# Patient Record
Sex: FEMALE | Race: WHITE | NOT HISPANIC OR LATINO | Employment: OTHER | URBAN - METROPOLITAN AREA
[De-identification: names, ages, dates, MRNs, and addresses within clinical notes are randomized per-mention and may not be internally consistent; named-entity substitution may affect disease eponyms.]

---

## 2017-04-26 ENCOUNTER — TRANSCRIBE ORDERS (OUTPATIENT)
Dept: ADMINISTRATIVE | Facility: HOSPITAL | Age: 72
End: 2017-04-26

## 2017-04-26 ENCOUNTER — LAB (OUTPATIENT)
Dept: LAB | Facility: HOSPITAL | Age: 72
End: 2017-04-26

## 2017-04-26 DIAGNOSIS — Z01.818 PREOP TESTING: Primary | ICD-10-CM

## 2017-04-26 DIAGNOSIS — Z01.818 PREOP TESTING: ICD-10-CM

## 2017-04-26 LAB
ALBUMIN SERPL-MCNC: 3.9 G/DL (ref 3.5–5.2)
ALBUMIN/GLOB SERPL: 1.3 G/DL
ALP SERPL-CCNC: 73 U/L (ref 40–129)
ALT SERPL W P-5'-P-CCNC: 24 U/L (ref 5–33)
ANION GAP SERPL CALCULATED.3IONS-SCNC: 12.7 MMOL/L
AST SERPL-CCNC: 20 U/L (ref 5–32)
BASOPHILS # BLD AUTO: 0.02 10*3/MM3 (ref 0–0.2)
BASOPHILS NFR BLD AUTO: 0.3 % (ref 0–2)
BILIRUB SERPL-MCNC: 0.5 MG/DL (ref 0.2–1.2)
BUN BLD-MCNC: 17 MG/DL (ref 8–23)
BUN/CREAT SERPL: 21 (ref 7–25)
CALCIUM SPEC-SCNC: 9.7 MG/DL (ref 8.8–10.5)
CHLORIDE SERPL-SCNC: 98 MMOL/L (ref 98–107)
CO2 SERPL-SCNC: 27.3 MMOL/L (ref 22–29)
CREAT BLD-MCNC: 0.81 MG/DL (ref 0.57–1)
DEPRECATED RDW RBC AUTO: 38.5 FL (ref 37–54)
EOSINOPHIL # BLD AUTO: 0.07 10*3/MM3 (ref 0.1–0.3)
EOSINOPHIL NFR BLD AUTO: 1.1 % (ref 0–4)
ERYTHROCYTE [DISTWIDTH] IN BLOOD BY AUTOMATED COUNT: 11.7 % (ref 11.5–14.5)
GFR SERPL CREATININE-BSD FRML MDRD: 69 ML/MIN/1.73
GLOBULIN UR ELPH-MCNC: 2.9 GM/DL
GLUCOSE BLD-MCNC: 177 MG/DL (ref 65–99)
HCT VFR BLD AUTO: 40.8 % (ref 37–47)
HGB BLD-MCNC: 13.5 G/DL (ref 12–16)
IMM GRANULOCYTES # BLD: 0.01 10*3/MM3 (ref 0–0.03)
IMM GRANULOCYTES NFR BLD: 0.2 % (ref 0–0.5)
LYMPHOCYTES # BLD AUTO: 2.31 10*3/MM3 (ref 0.6–4.8)
LYMPHOCYTES NFR BLD AUTO: 35.4 % (ref 20–45)
MCH RBC QN AUTO: 29.9 PG (ref 27–31)
MCHC RBC AUTO-ENTMCNC: 33.1 G/DL (ref 31–37)
MCV RBC AUTO: 90.3 FL (ref 81–99)
MONOCYTES # BLD AUTO: 0.33 10*3/MM3 (ref 0–1)
MONOCYTES NFR BLD AUTO: 5.1 % (ref 3–8)
NEUTROPHILS # BLD AUTO: 3.79 10*3/MM3 (ref 1.5–8.3)
NEUTROPHILS NFR BLD AUTO: 57.9 % (ref 45–70)
NRBC BLD MANUAL-RTO: 0 /100 WBC (ref 0–0)
PLATELET # BLD AUTO: 232 10*3/MM3 (ref 140–500)
PMV BLD AUTO: 9.8 FL (ref 7.4–10.4)
POTASSIUM BLD-SCNC: 4 MMOL/L (ref 3.5–5.2)
PROT SERPL-MCNC: 6.8 G/DL (ref 6–8.5)
RBC # BLD AUTO: 4.52 10*6/MM3 (ref 4.2–5.4)
SODIUM BLD-SCNC: 138 MMOL/L (ref 136–145)
WBC NRBC COR # BLD: 6.53 10*3/MM3 (ref 4.8–10.8)

## 2017-04-26 PROCEDURE — 85025 COMPLETE CBC W/AUTO DIFF WBC: CPT

## 2017-04-26 PROCEDURE — 36415 COLL VENOUS BLD VENIPUNCTURE: CPT

## 2017-04-26 PROCEDURE — 80053 COMPREHEN METABOLIC PANEL: CPT

## 2017-05-02 DIAGNOSIS — I10 ESSENTIAL HYPERTENSION: ICD-10-CM

## 2017-05-02 RX ORDER — LISINOPRIL 5 MG/1
TABLET ORAL
Qty: 90 TABLET | Refills: 0 | Status: SHIPPED | OUTPATIENT
Start: 2017-05-02 | End: 2017-07-31 | Stop reason: SDUPTHER

## 2017-07-31 DIAGNOSIS — I10 ESSENTIAL HYPERTENSION: ICD-10-CM

## 2017-07-31 RX ORDER — LISINOPRIL 5 MG/1
TABLET ORAL
Qty: 30 TABLET | Refills: 0 | Status: SHIPPED | OUTPATIENT
Start: 2017-07-31 | End: 2017-08-28 | Stop reason: SDUPTHER

## 2017-08-28 DIAGNOSIS — I10 ESSENTIAL HYPERTENSION: ICD-10-CM

## 2017-08-28 RX ORDER — LISINOPRIL 5 MG/1
5 TABLET ORAL DAILY
Qty: 90 TABLET | Refills: 0 | Status: SHIPPED | OUTPATIENT
Start: 2017-08-28 | End: 2017-09-07 | Stop reason: SDUPTHER

## 2017-09-07 ENCOUNTER — OFFICE VISIT (OUTPATIENT)
Dept: FAMILY MEDICINE CLINIC | Facility: CLINIC | Age: 72
End: 2017-09-07

## 2017-09-07 VITALS
TEMPERATURE: 97.6 F | WEIGHT: 210 LBS | BODY MASS INDEX: 31.83 KG/M2 | SYSTOLIC BLOOD PRESSURE: 130 MMHG | HEART RATE: 70 BPM | OXYGEN SATURATION: 98 % | DIASTOLIC BLOOD PRESSURE: 76 MMHG | HEIGHT: 68 IN | RESPIRATION RATE: 16 BRPM

## 2017-09-07 DIAGNOSIS — I10 ESSENTIAL HYPERTENSION: Primary | ICD-10-CM

## 2017-09-07 DIAGNOSIS — E78.00 HYPERCHOLESTEROLEMIA: ICD-10-CM

## 2017-09-07 LAB
ALBUMIN SERPL-MCNC: 4.2 G/DL (ref 3.5–5.2)
ALBUMIN/GLOB SERPL: 1.4 G/DL
ALP SERPL-CCNC: 73 U/L (ref 40–129)
ALT SERPL-CCNC: 23 U/L (ref 5–33)
AST SERPL-CCNC: 21 U/L (ref 5–32)
BILIRUB SERPL-MCNC: 0.4 MG/DL (ref 0.2–1.2)
BUN SERPL-MCNC: 19 MG/DL (ref 8–23)
BUN/CREAT SERPL: 25 (ref 7–25)
CALCIUM SERPL-MCNC: 9.7 MG/DL (ref 8.8–10.5)
CHLORIDE SERPL-SCNC: 98 MMOL/L (ref 98–107)
CHOLEST SERPL-MCNC: 231 MG/DL (ref 0–200)
CO2 SERPL-SCNC: 29.9 MMOL/L (ref 22–29)
CREAT SERPL-MCNC: 0.76 MG/DL (ref 0.57–1)
GLOBULIN SER CALC-MCNC: 2.9 GM/DL
GLUCOSE SERPL-MCNC: 102 MG/DL (ref 65–99)
HDLC SERPL-MCNC: 63 MG/DL (ref 40–60)
LDLC SERPL CALC-MCNC: 135 MG/DL (ref 0–100)
LDLC/HDLC SERPL: 2.14 {RATIO}
POTASSIUM SERPL-SCNC: 4.3 MMOL/L (ref 3.5–5.2)
PROT SERPL-MCNC: 7.1 G/DL (ref 6–8.5)
SODIUM SERPL-SCNC: 140 MMOL/L (ref 136–145)
TRIGL SERPL-MCNC: 167 MG/DL (ref 0–150)
VLDLC SERPL CALC-MCNC: 33.4 MG/DL (ref 7–27)

## 2017-09-07 PROCEDURE — 99213 OFFICE O/P EST LOW 20 MIN: CPT | Performed by: PHYSICIAN ASSISTANT

## 2017-09-07 RX ORDER — LISINOPRIL 5 MG/1
5 TABLET ORAL DAILY
Qty: 90 TABLET | Refills: 3 | Status: SHIPPED | OUTPATIENT
Start: 2017-09-07 | End: 2018-08-03 | Stop reason: SDUPTHER

## 2017-09-07 NOTE — PROGRESS NOTES
"Subjective   Sanjana Morse is a 72 y.o. female.   Chief Complaint   Patient presents with   • Hypertension     management       History of Present Illness     Sanjana is a 72-year-old female who presents for hypertension management.  Lost 5 pounds since November 28, 2016. Her blood pressure readings at home have been running 118-132/70's.  Denied any chest pain,shortness of air, headaches,wheezing or swelling of ankles.  Appetite has been healthy.  Sleep has been normal.  She has been walking daily.  Sanjana has complaint on her medications.      The following portions of the patient's history were reviewed and updated as appropriate: allergies, current medications, past family history, past medical history, past social history and past surgical history.    Review of Systems   Constitutional: Negative.    HENT: Negative.    Eyes: Negative.    Respiratory: Negative.  Negative for cough, shortness of breath and wheezing.    Cardiovascular: Negative.  Negative for chest pain, palpitations and leg swelling.   Gastrointestinal: Negative.    Endocrine: Negative.    Genitourinary: Negative.    Musculoskeletal: Negative.    Skin: Negative.    Allergic/Immunologic: Negative.    Neurological: Negative.    Hematological: Negative.    Psychiatric/Behavioral: Negative.    All other systems reviewed and are negative.    Vitals:    09/07/17 0758 09/07/17 0828   BP: 150/72 130/76   BP Location: Right arm Left arm   Patient Position: Sitting Sitting   Cuff Size: Large Adult Adult   Pulse: 70    Resp: 16    Temp: 97.6 °F (36.4 °C)    TempSrc: Oral    SpO2: 98%    Weight: 210 lb (95.3 kg)    Height: 68\" (172.7 cm)        Wt Readings from Last 3 Encounters:   09/07/17 210 lb (95.3 kg)   11/28/16 215 lb 14.4 oz (97.9 kg)   11/18/15 202 lb 0.1 oz (91.6 kg)       BP Readings from Last 3 Encounters:   09/07/17 130/76   11/28/16 138/70   11/18/15 124/70     Body mass index is 31.93 kg/(m^2).  No Known Allergies    Objective   Physical " Exam   Constitutional: She is oriented to person, place, and time. Vital signs are normal. She appears well-developed and well-nourished.   Cardiovascular: Normal rate, regular rhythm, S1 normal, S2 normal, normal heart sounds and normal pulses.    Pulmonary/Chest: Effort normal and breath sounds normal.   Abdominal: Soft. Normal appearance and bowel sounds are normal. There is no hepatomegaly. There is no tenderness.   Neurological: She is alert and oriented to person, place, and time.   Skin: Skin is warm, dry and intact.   Psychiatric: She has a normal mood and affect. Her speech is normal and behavior is normal. Judgment and thought content normal. Cognition and memory are normal.       Assessment/Plan   Sanjana was seen today for hypertension.    Diagnoses and all orders for this visit:    Essential hypertension    Hypercholesterolemia    Mrs. Sanjana Barrios was seen in office today for follow-up for hypertension and hypercholesterolemia.  I have rechecked blood pressure today's office visit and got 1:30/76 in left arm.  I have refilled her lisinopril medication to pharmacy.  She is fasting will have a CMP and a lipid profile collected.  She will be notified of results when completed.  Sanjana will schedule a sequential Medicare physical after November 29, 2017.        Dragon transcription disclaimer    Much of this encounter note is an electronic transcription/translation of spoken language to printed text.  The electronic translation of spoken language may permit erroneous, or at times, nonsensical words or phrases to be inadvertently transcribed.  Although I have reviewed the note for such errors, some may still exist.    Reyna Singh PA-C  Family Practice

## 2017-09-08 ENCOUNTER — TELEPHONE (OUTPATIENT)
Dept: FAMILY MEDICINE CLINIC | Facility: CLINIC | Age: 72
End: 2017-09-08

## 2017-09-08 NOTE — TELEPHONE ENCOUNTER
----- Message from Reyna Singh PA-C sent at 9/8/2017  7:14 AM EDT -----  1.  FBS was 102 which is slightly increased.  Please decrease carbohydrates in diet.  2.  Cholesterol 231, triglycerides 167, HDL 63 and LDL was 135.  These are increased from 9 months ago.  I suspect this is related to diet.  Please decrease carbohydrates and fatty foods in diet.  Increase physical activity.  Plan to repeat lipid profile with CMP in 6 months.  If cholesterol and triglyceride readings are still elevated we will start a statin therapy.

## 2017-10-12 ENCOUNTER — OFFICE VISIT (OUTPATIENT)
Dept: FAMILY MEDICINE CLINIC | Facility: CLINIC | Age: 72
End: 2017-10-12

## 2017-10-12 VITALS
OXYGEN SATURATION: 99 % | DIASTOLIC BLOOD PRESSURE: 62 MMHG | RESPIRATION RATE: 16 BRPM | SYSTOLIC BLOOD PRESSURE: 120 MMHG | BODY MASS INDEX: 31.98 KG/M2 | TEMPERATURE: 98.1 F | HEART RATE: 86 BPM | HEIGHT: 68 IN | WEIGHT: 211 LBS

## 2017-10-12 DIAGNOSIS — R51.9 SINUS HEADACHE: Primary | ICD-10-CM

## 2017-10-12 DIAGNOSIS — J01.00 ACUTE NON-RECURRENT MAXILLARY SINUSITIS: ICD-10-CM

## 2017-10-12 PROCEDURE — 99214 OFFICE O/P EST MOD 30 MIN: CPT | Performed by: PHYSICIAN ASSISTANT

## 2017-10-12 RX ORDER — AMOXICILLIN 875 MG/1
875 TABLET, COATED ORAL 2 TIMES DAILY
Qty: 20 TABLET | Refills: 0 | Status: SHIPPED | OUTPATIENT
Start: 2017-10-12 | End: 2017-10-22

## 2017-10-12 NOTE — PROGRESS NOTES
Subjective   Sanjana Morse is a 72 y.o. female.   Chief Complaint   Patient presents with   • Pain     on top of head       History of Present Illness     Sanjana is a 72-year-old female who presents sharp headache off and on for the past 2 weeks. The pain has radiated to her right maxillary sinus area.   States the pain may last a few hours.  She has had some sinus pressure,and post nasal drip off and on.  She denied any photophobia,phonophobia,vision changes,slurred speech,facial dropping,nausea,vomiting,cough,chest pain,shortness of air,fevers,or chills.  Sanjana took her blood pressure medication an hour before office visit.  States Advil has helped with her headaches.  She has not had any pain for the past day. Appetite has been good.  Sleep has been good.  Caffeine intake has been 36-48 oz of tea a day. Denied any head injury or rashes on scalp.    The following portions of the patient's history were reviewed and updated as appropriate: allergies, current medications, past family history, past medical history, past social history and past surgical history.    Review of Systems   Constitutional: Negative.  Negative for chills, fatigue and fever.   HENT: Positive for sinus pain and sinus pressure. Negative for congestion, ear pain, postnasal drip, rhinorrhea and sore throat.    Eyes: Negative.    Respiratory: Negative.    Cardiovascular: Negative.    Gastrointestinal: Negative.    Endocrine: Negative.    Genitourinary: Negative.    Musculoskeletal: Negative.    Skin: Negative.    Allergic/Immunologic: Negative.    Neurological: Positive for light-headedness and headaches. Negative for dizziness.   Hematological: Negative.    Psychiatric/Behavioral: Negative.    All other systems reviewed and are negative.    Vitals:    10/12/17 0753 10/12/17 0809   BP: 158/62 120/62   BP Location: Left arm Left arm   Patient Position: Sitting Sitting   Cuff Size: Large Adult Adult   Pulse: 86    Resp: 16    Temp: 98.1 °F (36.7  "°C)    TempSrc: Oral    SpO2: 99%    Weight: 211 lb (95.7 kg)    Height: 68\" (172.7 cm)        Wt Readings from Last 3 Encounters:   10/12/17 211 lb (95.7 kg)   09/07/17 210 lb (95.3 kg)   11/28/16 215 lb 14.4 oz (97.9 kg)       BP Readings from Last 3 Encounters:   10/12/17 120/62   09/07/17 130/76   11/28/16 138/70     Body mass index is 32.08 kg/(m^2).  No Known Allergies    Objective   Physical Exam   Constitutional: She is oriented to person, place, and time. Vital signs are normal. She appears well-developed and well-nourished.   No slurred speech or facial drooping noted   HENT:   Head: Normocephalic and atraumatic.   Right Ear: Hearing, external ear and ear canal normal. Tympanic membrane is bulging.   Left Ear: Hearing, external ear and ear canal normal. Tympanic membrane is bulging.   Nose: Right sinus exhibits maxillary sinus tenderness. Right sinus exhibits no frontal sinus tenderness. Left sinus exhibits no maxillary sinus tenderness and no frontal sinus tenderness.   Mouth/Throat: Uvula is midline and mucous membranes are normal.   Slight post nasal drip   Eyes: Conjunctivae, EOM and lids are normal. Pupils are equal, round, and reactive to light.   Fundoscopic exam:       The right eye shows no papilledema.        The left eye shows no papilledema.   Neck: Trachea normal and phonation normal. Neck supple. Carotid bruit is not present. No edema present. No thyromegaly present.   Cardiovascular: Normal rate, regular rhythm, S1 normal, S2 normal, normal heart sounds, intact distal pulses and normal pulses.    Pulmonary/Chest: Effort normal and breath sounds normal.   Abdominal: Soft. Normal appearance, normal aorta and bowel sounds are normal. There is no hepatomegaly. There is no tenderness.   Lymphadenopathy:     She has no cervical adenopathy.   Neurological: She is alert and oriented to person, place, and time. She has normal strength and normal reflexes. No cranial nerve deficit or sensory deficit. " She displays a negative Romberg sign.   Reflex Scores:       Patellar reflexes are 2+ on the right side and 2+ on the left side.  Skin: Skin is warm, dry and intact.   Psychiatric: She has a normal mood and affect. Her speech is normal and behavior is normal. Judgment and thought content normal. Cognition and memory are normal.       Assessment/Plan   Sanjana was seen today for pain.    Diagnoses and all orders for this visit:    Sinus headache  -     amoxicillin (AMOXIL) 875 MG tablet; Take 1 tablet by mouth 2 (Two) Times a Day for 10 days.    Acute non-recurrent maxillary sinusitis  -     amoxicillin (AMOXIL) 875 MG tablet; Take 1 tablet by mouth 2 (Two) Times a Day for 10 days.      Mrs. Sanjana Morse was seen in office today for new onset acute maxillary sinusitis with sinus headache.  I suspect her symptoms are related to her sinusitis.  I have prescribed amoxicillin antibiotic to pharmacy.  Sanjana will continue using over-the-counter ibuprofen as needed for headache.  She will return to office if no improvement.  I will consider possible imaging studies of brain/sinuses if no improvement.        Dragon transcription disclaimer    Much of this encounter note is an electronic transcription/translation of spoken language to printed text.  The electronic translation of spoken language may permit erroneous, or at times, nonsensical words or phrases to be inadvertently transcribed.  Although I have reviewed the note for such errors, some may still exist.    Reyna Singh PA-C  Family Practice

## 2017-10-18 ENCOUNTER — TELEPHONE (OUTPATIENT)
Dept: FAMILY MEDICINE CLINIC | Facility: CLINIC | Age: 72
End: 2017-10-18

## 2017-10-18 DIAGNOSIS — R51.9 GENERALIZED HEADACHES: Primary | ICD-10-CM

## 2017-10-19 ENCOUNTER — HOSPITAL ENCOUNTER (OUTPATIENT)
Dept: MRI IMAGING | Facility: HOSPITAL | Age: 72
Discharge: HOME | End: 2017-10-19
Admitting: PHYSICIAN ASSISTANT

## 2017-10-19 DIAGNOSIS — R51.9 GENERALIZED HEADACHES: ICD-10-CM

## 2017-10-19 PROCEDURE — 70551 MRI BRAIN STEM W/O DYE: CPT

## 2017-10-19 RX ORDER — LORAZEPAM 0.5 MG/1
TABLET ORAL
Qty: 2 TABLET | Refills: 0
Start: 2017-10-19 | End: 2018-08-03

## 2017-10-20 ENCOUNTER — TELEPHONE (OUTPATIENT)
Dept: FAMILY MEDICINE CLINIC | Facility: CLINIC | Age: 72
End: 2017-10-20

## 2017-10-20 NOTE — TELEPHONE ENCOUNTER
----- Message from Reyna Singh PA-C sent at 10/20/2017 11:36 AM EDT -----  Notify patient that her MRI of brain was normal.  Have her stop by office today or Monday for a blood pressure check without office visit

## 2018-08-03 ENCOUNTER — OFFICE VISIT (OUTPATIENT)
Dept: FAMILY MEDICINE CLINIC | Facility: CLINIC | Age: 73
End: 2018-08-03

## 2018-08-03 VITALS
TEMPERATURE: 98.3 F | WEIGHT: 206.7 LBS | OXYGEN SATURATION: 97 % | HEIGHT: 68 IN | BODY MASS INDEX: 31.33 KG/M2 | RESPIRATION RATE: 16 BRPM | DIASTOLIC BLOOD PRESSURE: 70 MMHG | SYSTOLIC BLOOD PRESSURE: 130 MMHG | HEART RATE: 65 BPM

## 2018-08-03 DIAGNOSIS — Z00.00 MEDICARE ANNUAL WELLNESS VISIT, SUBSEQUENT: Primary | ICD-10-CM

## 2018-08-03 DIAGNOSIS — Z78.0 POSTMENOPAUSE: ICD-10-CM

## 2018-08-03 DIAGNOSIS — I10 ESSENTIAL HYPERTENSION: ICD-10-CM

## 2018-08-03 DIAGNOSIS — E78.00 HYPERCHOLESTEROLEMIA: ICD-10-CM

## 2018-08-03 DIAGNOSIS — Z12.31 SCREENING MAMMOGRAM, ENCOUNTER FOR: ICD-10-CM

## 2018-08-03 LAB
ALBUMIN SERPL-MCNC: 4.5 G/DL (ref 3.5–5.2)
ALBUMIN/GLOB SERPL: 2 G/DL
ALP SERPL-CCNC: 69 U/L (ref 40–129)
ALT SERPL-CCNC: 21 U/L (ref 5–33)
AST SERPL-CCNC: 20 U/L (ref 5–32)
BASOPHILS # BLD AUTO: 0.04 10*3/MM3 (ref 0–0.2)
BASOPHILS NFR BLD AUTO: 0.6 % (ref 0–2)
BILIRUB SERPL-MCNC: 0.6 MG/DL (ref 0.2–1.2)
BUN SERPL-MCNC: 20 MG/DL (ref 8–23)
BUN/CREAT SERPL: 24.7 (ref 7–25)
CALCIUM SERPL-MCNC: 10.1 MG/DL (ref 8.8–10.5)
CHLORIDE SERPL-SCNC: 99 MMOL/L (ref 98–107)
CHOLEST SERPL-MCNC: 207 MG/DL (ref 0–200)
CO2 SERPL-SCNC: 30.6 MMOL/L (ref 22–29)
CREAT SERPL-MCNC: 0.81 MG/DL (ref 0.57–1)
EOSINOPHIL # BLD AUTO: 0.11 10*3/MM3 (ref 0.1–0.3)
EOSINOPHIL NFR BLD AUTO: 1.5 % (ref 0–4)
ERYTHROCYTE [DISTWIDTH] IN BLOOD BY AUTOMATED COUNT: 11.5 % (ref 11.5–14.5)
GLOBULIN SER CALC-MCNC: 2.3 GM/DL
GLUCOSE SERPL-MCNC: 105 MG/DL (ref 65–99)
HCT VFR BLD AUTO: 44 % (ref 37–47)
HDLC SERPL-MCNC: 63 MG/DL (ref 40–60)
HGB BLD-MCNC: 14.4 G/DL (ref 12–16)
IMM GRANULOCYTES # BLD: 0.02 10*3/MM3 (ref 0–0.03)
IMM GRANULOCYTES NFR BLD: 0.3 % (ref 0–0.5)
LDLC SERPL CALC-MCNC: 116 MG/DL (ref 0–100)
LDLC/HDLC SERPL: 1.85 {RATIO}
LYMPHOCYTES # BLD AUTO: 2.67 10*3/MM3 (ref 0.6–4.8)
LYMPHOCYTES NFR BLD AUTO: 37.6 % (ref 20–45)
MCH RBC QN AUTO: 31.2 PG (ref 27–31)
MCHC RBC AUTO-ENTMCNC: 32.7 G/DL (ref 31–37)
MCV RBC AUTO: 95.4 FL (ref 81–99)
MONOCYTES # BLD AUTO: 0.63 10*3/MM3 (ref 0–1)
MONOCYTES NFR BLD AUTO: 8.9 % (ref 3–8)
NEUTROPHILS # BLD AUTO: 3.64 10*3/MM3 (ref 1.5–8.3)
NEUTROPHILS NFR BLD AUTO: 51.1 % (ref 45–70)
NRBC BLD AUTO-RTO: 0 /100 WBC (ref 0–0)
PLATELET # BLD AUTO: 250 10*3/MM3 (ref 140–500)
POTASSIUM SERPL-SCNC: 4.6 MMOL/L (ref 3.5–5.2)
PROT SERPL-MCNC: 6.8 G/DL (ref 6–8.5)
RBC # BLD AUTO: 4.61 10*6/MM3 (ref 4.2–5.4)
SODIUM SERPL-SCNC: 139 MMOL/L (ref 136–145)
TRIGL SERPL-MCNC: 138 MG/DL (ref 0–150)
VLDLC SERPL CALC-MCNC: 27.6 MG/DL (ref 7–27)
WBC # BLD AUTO: 7.11 10*3/MM3 (ref 4.8–10.8)

## 2018-08-03 PROCEDURE — 99212 OFFICE O/P EST SF 10 MIN: CPT | Performed by: PHYSICIAN ASSISTANT

## 2018-08-03 PROCEDURE — 96160 PT-FOCUSED HLTH RISK ASSMT: CPT | Performed by: PHYSICIAN ASSISTANT

## 2018-08-03 PROCEDURE — G0439 PPPS, SUBSEQ VISIT: HCPCS | Performed by: PHYSICIAN ASSISTANT

## 2018-08-03 RX ORDER — LISINOPRIL 5 MG/1
5 TABLET ORAL DAILY
Qty: 90 TABLET | Refills: 3 | Status: SHIPPED | OUTPATIENT
Start: 2018-08-03 | End: 2019-08-22

## 2018-08-03 NOTE — PROGRESS NOTES
QUICK REFERENCE INFORMATION:  The ABCs of the Annual Wellness Visit    Subsequent Medicare Wellness Visit    HEALTH RISK ASSESSMENT    1945    Recent Hospitalizations:  No hospitalization(s) within the last year..        Current Medical Providers:  Patient Care Team:  Reyna Singh PA-C as PCP - General        Smoking Status:  History   Smoking Status   • Former Smoker   Smokeless Tobacco   • Not on file       Alcohol Consumption:  History   Alcohol use Not on file       Depression Screen:   PHQ-2/PHQ-9 Depression Screening 8/3/2018   Little interest or pleasure in doing things 0   Feeling down, depressed, or hopeless 0   Trouble falling or staying asleep, or sleeping too much 0   Feeling tired or having little energy 0   Poor appetite or overeating 0   Feeling bad about yourself - or that you are a failure or have let yourself or your family down 0   Trouble concentrating on things, such as reading the newspaper or watching television 0   Moving or speaking so slowly that other people could have noticed. Or the opposite - being so fidgety or restless that you have been moving around a lot more than usual 0   Thoughts that you would be better off dead, or of hurting yourself in some way 0   Total Score 0   If you checked off any problems, how difficult have these problems made it for you to do your work, take care of things at home, or get along with other people? -       Health Habits and Functional and Cognitive Screening:  Functional & Cognitive Status 8/3/2018   Do you have difficulty preparing food and eating? No   Do you have difficulty bathing yourself, getting dressed or grooming yourself? No   Do you have difficulty using the toilet? No   Do you have difficulty moving around from place to place? No   Do you have trouble with steps or getting out of a bed or a chair? No   In the past year have you fallen or experienced a near fall? No   Current Diet Well Balanced Diet   Dental Exam Up to date   Eye  Exam Up to date   Exercise (times per week) 5 times per week   Current Exercise Activities Include Walking   Do you need help using the phone?  No   Are you deaf or do you have serious difficulty hearing?  No   Do you need help with transportation? No   Do you need help shopping? No   Do you need help preparing meals?  No   Do you need help with housework?  No   Do you need help with laundry? No   Do you need help taking your medications? No   Do you need help managing money? No   Do you ever drive or ride in a car without wearing a seat belt? No   Have you felt unusual stress, anger or loneliness in the last month? No   Who do you live with? Spouse   If you need help, do you have trouble finding someone available to you? No   Have you been bothered in the last four weeks by sexual problems? No   Do you have difficulty concentrating, remembering or making decisions? No           Does the patient have evidence of cognitive impairment? No    Aspirin use counseling: Start ASA 81 mg daily       Recent Lab Results:  CMP:  Lab Results   Component Value Date     (H) 09/07/2017    BUN 19 09/07/2017    CREATININE 0.76 09/07/2017    EGFRIFNONA 75 09/07/2017    EGFRIFAFRI 90 09/07/2017    BCR 25.0 09/07/2017     09/07/2017    K 4.3 09/07/2017    CO2 29.9 (H) 09/07/2017    CALCIUM 9.7 09/07/2017    PROTENTOTREF 7.1 09/07/2017    ALBUMIN 4.20 09/07/2017    LABGLOBREF 2.9 09/07/2017    LABIL2 1.4 09/07/2017    BILITOT 0.4 09/07/2017    ALKPHOS 73 09/07/2017    AST 21 09/07/2017    ALT 23 09/07/2017     Lipid Panel:  Lab Results   Component Value Date    TRIG 167 (H) 09/07/2017    HDL 63 (H) 09/07/2017    VLDL 33.4 (H) 09/07/2017    LDLHDL 2.14 09/07/2017     HbA1c:       Visual Acuity:  No exam data present    Age-appropriate Screening Schedule:  Refer to the list below for future screening recommendations based on patient's age, sex and/or medical conditions. Orders for these recommended tests are listed in the plan  section. The patient has been provided with a written plan.    Health Maintenance   Topic Date Due   • ZOSTER VACCINE (1 of 2) 03/09/1994   • MAMMOGRAM  02/18/2018   • INFLUENZA VACCINE  08/01/2018   • LIPID PANEL  09/07/2018   • COLONOSCOPY  09/15/2019   • TDAP/TD VACCINES (2 - Td) 11/28/2026   • PNEUMOCOCCAL VACCINES (65+ LOW/MEDIUM RISK)  Completed        Subjective   History of Present Illness    Sanjana Morse is a 73 y.o. female who presents for an Subsequent Wellness Visit.  Sanjana states she is feeling well at office visit today.  States she had her shingles immunization one or 2 years ago at [facility] pharmacy.  Diet has been so-so.  States she eats whatever she likes to eat.  Sleep has been normal.  Sanjana has been walking 5 times a week for exercise.  She also dizziness housework and yard work.  Bowel movements have been daily without dark black tarry stools.  She is due for colonoscopy next year.  Last mammogram was at [facility].  She is due for this.  She does do self breast examinations at home.  Sanjana has had a complete hysterectomy.  Last DEXA scan was November 28, 2016.  Denied any chest pain, shortness of air, dizziness, vision changes, abdominal pain, dysuria, or swelling of ankles.  She has no complaints at office visit today.    The following portions of the patient's history were reviewed and updated as appropriate: allergies, current medications, past family history, past medical history, past social history and past surgical history.    Outpatient Medications Prior to Visit   Medication Sig Dispense Refill   • lisinopril (PRINIVIL,ZESTRIL) 5 MG tablet Take 1 tablet by mouth Daily. 90 tablet 3   • LORazepam (ATIVAN) 0.5 MG tablet Take 1-2 tablets 30 minutes before MRI 2 tablet 0     No facility-administered medications prior to visit.        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    Review of Systems   Constitutional: Negative.    HENT: Negative.    Eyes: Negative.    Respiratory: Negative.  Negative for cough, shortness of breath and wheezing.    Cardiovascular: Negative.  Negative for chest pain, palpitations and leg swelling.   Gastrointestinal: Negative.  Negative for constipation, diarrhea, nausea and vomiting.   Endocrine: Negative.    Genitourinary: Negative.    Musculoskeletal: Negative.    Skin: Negative.    Allergic/Immunologic: Negative.    Neurological: Negative.  Negative for dizziness, light-headedness and headaches.   Hematological: Negative.    Psychiatric/Behavioral: Negative.    All other systems reviewed and are negative.      Compared to one year ago, the patient feels her physical health is the same.  Compared to one year ago, the patient feels her mental health is the same.    Objective     Physical Exam   Constitutional: She is oriented to person, place, and time. Vital signs are normal. She appears well-developed and well-nourished.   HENT:   Head: Normocephalic and atraumatic.   Right Ear: Hearing, tympanic membrane, external ear and ear canal normal.   Left Ear: Hearing, tympanic membrane, external ear and ear canal normal.   Nose: Nose normal. Right sinus exhibits no maxillary sinus tenderness and no frontal sinus tenderness. Left sinus exhibits no maxillary sinus tenderness and no frontal sinus tenderness.   Mouth/Throat: Uvula  "is midline, oropharynx is clear and moist and mucous membranes are normal.   Eyes: Pupils are equal, round, and reactive to light. Conjunctivae, EOM and lids are normal.   Neck: Trachea normal and phonation normal. Neck supple. Carotid bruit is not present. No edema present. No thyromegaly present.   Cardiovascular: Normal rate, regular rhythm, S1 normal, S2 normal, normal heart sounds and normal pulses.    Pulmonary/Chest: Effort normal and breath sounds normal. Right breast exhibits no inverted nipple, no mass and no nipple discharge. Left breast exhibits no inverted nipple, no mass, no nipple discharge, no skin change and no tenderness. Breasts are symmetrical. There is no breast swelling.   Abdominal: Soft. Normal appearance, normal aorta and bowel sounds are normal. There is no hepatomegaly. There is no tenderness.   Genitourinary: No breast tenderness, discharge or bleeding.   Genitourinary Comments: Deferred by patient.  She has had a complete hysterectomy.   Lymphadenopathy:     She has no cervical adenopathy.     She has no axillary adenopathy.   Neurological: She is alert and oriented to person, place, and time.   Skin: Skin is warm, dry and intact. Capillary refill takes less than 2 seconds.   Psychiatric: She has a normal mood and affect. Her speech is normal and behavior is normal. Judgment and thought content normal. Cognition and memory are normal.       Vitals:    08/03/18 0745 08/03/18 0811   BP: 130/70    BP Location: Left arm    Patient Position: Sitting    Cuff Size: Adult    Pulse: 65    Resp: 16    Temp: 98.3 °F (36.8 °C)    TempSrc: Oral    SpO2: 97%    Weight: 94.3 kg (208 lb) 93.8 kg (206 lb 11.2 oz)   Height: 172.7 cm (68\")    PainSc: 0-No pain        Patient's Body mass index is 31.43 kg/m². BMI is above normal parameters. Recommendations include: exercise counseling and nutrition counseling.      Assessment/Plan   Patient Self-Management and Personalized Health Advice  The patient has " been provided with information about: diet and exercise and preventive services including:   · Screening mammography, referral placed.    Visit Diagnoses:    ICD-10-CM ICD-9-CM   1. Medicare annual wellness visit, subsequent Z00.00 V70.0   2. Essential hypertension I10 401.9   3. Hypercholesterolemia E78.00 272.0   4. Screening mammogram, encounter for Z12.31 V76.12   5. Postmenopause Z78.0 V49.81     1.  Annual [employer] medicare wellness examination with hypertension: I have rechecked bony spurs her at today's office visit and got 120/64 in left arm.  I have refilled her lisinopril medication to pharmacy.  She will return to office in 6 months for hypertension management.  Will try to attain her updated shingles immunization from her [facility] pharmacy.  2.  Chronic and stable hypercholesterolemia: Sanjana is fasting will have a CBC, CMP and a lipid profile.  She'll be notified of test results when completed.  I've asked her to make dietary changes by eating healthy and lose weight.  She was encouraged to increase her physical activity as well.  3.  Need for screening mammogram: I have given Sanjana a written order for her mammogram to be performed at [facility].  She has had her previous mammograms there.  She'll be notified of test results when completed.  4.  Chronic postmenopausal: I have placed a DEXA scan for the [place] facility.  This is due after November 28, 2018.  Sanjana will be notified of test results when completed.    Orders Placed This Encounter   Procedures   • Mammo Screening Bilateral With CAD     Standing Status:   Future     Standing Expiration Date:   8/4/2019     Scheduling Instructions:      [place]-patient request     Order Specific Question:   Reason for Exam:     Answer:   screening mammogram   • DEXA Bone Density Axial     Standing Status:   Future     Standing Expiration Date:   8/4/2019     Scheduling Instructions:      [place] need after November 28,2018     Order Specific  Question:   Reason for Exam:     Answer:   post menopausal   • Comprehensive Metabolic Panel   • Lipid Panel With LDL / HDL Ratio   • CBC & Differential     Order Specific Question:   Manual Differential     Answer:   No       Outpatient Encounter Prescriptions as of 8/3/2018   Medication Sig Dispense Refill   • lisinopril (PRINIVIL,ZESTRIL) 5 MG tablet Take 1 tablet by mouth Daily. 90 tablet 3   • [DISCONTINUED] lisinopril (PRINIVIL,ZESTRIL) 5 MG tablet Take 1 tablet by mouth Daily. 90 tablet 3   • [DISCONTINUED] LORazepam (ATIVAN) 0.5 MG tablet Take 1-2 tablets 30 minutes before MRI 2 tablet 0     No facility-administered encounter medications on file as of 8/3/2018.        Reviewed use of high risk medication in the elderly: yes  Reviewed for potential of harmful drug interactions in the elderly: yes    Follow Up:  No Follow-up on file.     An After Visit Summary and PPPS with all of these plans were given to the patient.

## 2018-08-06 DIAGNOSIS — Z00.00 MEDICARE ANNUAL WELLNESS VISIT, SUBSEQUENT: Primary | ICD-10-CM

## 2018-08-06 LAB
DEVELOPER EXPIRATION DATE: NORMAL
DEVELOPER LOT NUMBER: NORMAL
EXPIRATION DATE: NORMAL
FECAL OCCULT BLOOD SCREEN, POC: NEGATIVE
Lab: NORMAL
NEGATIVE CONTROL: NEGATIVE
POSITIVE CONTROL: POSITIVE

## 2018-08-06 PROCEDURE — 82274 ASSAY TEST FOR BLOOD FECAL: CPT | Performed by: PHYSICIAN ASSISTANT

## 2018-08-13 ENCOUNTER — APPOINTMENT (OUTPATIENT)
Dept: BONE DENSITY | Facility: HOSPITAL | Age: 73
End: 2018-08-13

## 2019-07-24 ENCOUNTER — TELEPHONE (OUTPATIENT)
Dept: FAMILY MEDICINE CLINIC | Facility: CLINIC | Age: 74
End: 2019-07-24

## 2019-07-24 NOTE — TELEPHONE ENCOUNTER
Pt was seen recently at the ER for sinus issues.    Pt states she was diagnosed with bronchitis, hiatal hernia, and a lung nodule.    Pt has concerns about having diarrhea, and thinks it may be related to her new diagnoses.     Pt also would like to know what she should do about these issues?   She does have an appt with us on 08/22. But would like to know if she should be seen before then due to these issues?    Please advise.

## 2019-07-29 ENCOUNTER — OFFICE VISIT (OUTPATIENT)
Dept: FAMILY MEDICINE CLINIC | Facility: CLINIC | Age: 74
End: 2019-07-29

## 2019-07-29 VITALS
BODY MASS INDEX: 31.98 KG/M2 | HEIGHT: 68 IN | OXYGEN SATURATION: 96 % | RESPIRATION RATE: 18 BRPM | DIASTOLIC BLOOD PRESSURE: 78 MMHG | TEMPERATURE: 98 F | SYSTOLIC BLOOD PRESSURE: 140 MMHG | HEART RATE: 76 BPM | WEIGHT: 211 LBS

## 2019-07-29 DIAGNOSIS — R06.2 WHEEZING: Primary | ICD-10-CM

## 2019-07-29 DIAGNOSIS — R05.9 COUGH: ICD-10-CM

## 2019-07-29 DIAGNOSIS — IMO0001 LUNG NODULE < 6CM ON CT: ICD-10-CM

## 2019-07-29 PROBLEM — M23.322 DEGENERATIVE TEAR OF POSTERIOR HORN OF MEDIAL MENISCUS OF LEFT KNEE: Status: ACTIVE | Noted: 2019-02-21

## 2019-07-29 PROCEDURE — 94640 AIRWAY INHALATION TREATMENT: CPT | Performed by: PHYSICIAN ASSISTANT

## 2019-07-29 PROCEDURE — 99214 OFFICE O/P EST MOD 30 MIN: CPT | Performed by: PHYSICIAN ASSISTANT

## 2019-07-29 RX ORDER — IPRATROPIUM BROMIDE AND ALBUTEROL SULFATE 2.5; .5 MG/3ML; MG/3ML
3 SOLUTION RESPIRATORY (INHALATION) ONCE
Status: COMPLETED | OUTPATIENT
Start: 2019-07-29 | End: 2019-07-29

## 2019-07-29 RX ORDER — PREDNISONE 20 MG/1
20 TABLET ORAL
COMMUNITY
Start: 2019-07-17 | End: 2019-08-22

## 2019-07-29 RX ORDER — ALBUTEROL SULFATE 90 UG/1
2 AEROSOL, METERED RESPIRATORY (INHALATION)
COMMUNITY
Start: 2019-07-22 | End: 2020-02-14

## 2019-07-29 RX ORDER — IPRATROPIUM BROMIDE AND ALBUTEROL SULFATE 2.5; .5 MG/3ML; MG/3ML
3 SOLUTION RESPIRATORY (INHALATION) EVERY 4 HOURS PRN
Qty: 25 VIAL | Refills: 6 | Status: SHIPPED | OUTPATIENT
Start: 2019-07-29 | End: 2020-02-14

## 2019-07-29 RX ORDER — LEVOFLOXACIN 500 MG/1
500 TABLET, FILM COATED ORAL DAILY
COMMUNITY
Start: 2019-07-28 | End: 2019-08-04

## 2019-07-29 RX ORDER — MULTIVIT-MIN/IRON/FOLIC ACID/K 18-600-40
CAPSULE ORAL DAILY
COMMUNITY
End: 2020-04-13

## 2019-07-29 RX ADMIN — IPRATROPIUM BROMIDE AND ALBUTEROL SULFATE 3 ML: 2.5; .5 SOLUTION RESPIRATORY (INHALATION) at 08:10

## 2019-07-29 NOTE — PROGRESS NOTES
Subjective   Sanjana Morse is a 74 y.o. female presents for   Chief Complaint   Patient presents with   • Follow-up     er f/u x3 SOA, Chest tighness   • URI   • Sore Throat       History of Present Illness     Sanjana is a 74-year-old female who presents for follow-up from [facility] emergency room twice in the past week.  She was seen on July 16, 2019 and again on July 27, 2019.  She had a x-ray that showed no acute findings.  They did a CTA of chest for further evaluation of her symptoms on July 22, 2019 that showed possible infiltrate.  She also had 2-3 mm bilateral pulmonary nodules.  Radiologist recommended follow-up in 6-12 months. She was prescribed Tessalon Perles, prednisone taper dose given a Duo Neb treatment at the emergency room.  She was told to take Mucinex along with Tessalon Perles.  She has seen slight improvement with the Duo Neb.  She was to continue her antibiotic at home.  She has an appointment scheduled with the pulmonologist at [place] in early September.  Office visit today she states she still feeling congested with some wheezing.  States the nebulizer treatment helped.  She would like a nebulizer at home.  She has been compliant with her medications.  She still has a slight productive cough that is clear.  Denied any fevers, chills, chest pain, sore throat, rhinorrhea, headache, nausea, vomiting or diarrhea.  Her appetite and sleep have decreased.      The following portions of the patient's history were reviewed and updated as appropriate: allergies, current medications, past family history, past medical history, past social history, past surgical history and problem list.    Review of Systems   Constitutional: Negative.  Negative for chills, fatigue and fever.   HENT: Positive for congestion. Negative for ear pain, postnasal drip, rhinorrhea, sinus pressure, sinus pain and sneezing.    Eyes: Negative.    Respiratory: Positive for cough and wheezing. Negative for shortness  "of breath.    Cardiovascular: Negative.  Negative for chest pain, palpitations and leg swelling.   Gastrointestinal: Negative.  Negative for diarrhea, nausea and vomiting.   Endocrine: Negative.    Genitourinary: Negative.    Musculoskeletal: Negative.    Skin: Negative.    Allergic/Immunologic: Negative.    Neurological: Negative.  Negative for dizziness, light-headedness and headaches.   Hematological: Negative.    Psychiatric/Behavioral: Negative.    All other systems reviewed and are negative.        Vitals:    07/29/19 0735   BP: 140/78   BP Location: Left arm   Patient Position: Sitting   Cuff Size: Adult   Pulse: 76   Resp: 18   Temp: 98 °F (36.7 °C)   SpO2: 96%   Weight: 95.7 kg (211 lb)   Height: 172.7 cm (68\")     Wt Readings from Last 3 Encounters:   07/29/19 95.7 kg (211 lb)   08/03/18 93.8 kg (206 lb 11.2 oz)   10/19/17 95.3 kg (210 lb)     BP Readings from Last 3 Encounters:   07/29/19 140/78   08/03/18 130/70   10/12/17 120/62     Social History     Socioeconomic History   • Marital status:      Spouse name: Not on file   • Number of children: Not on file   • Years of education: Not on file   • Highest education level: Not on file   Tobacco Use   • Smoking status: Former Smoker       Allergies   Allergen Reactions   • Hydrocodone Other (See Comments)     hallucinations       Body mass index is 32.08 kg/m².    Objective   Physical Exam   Constitutional: She is oriented to person, place, and time. Vital signs are normal. She appears well-developed and well-nourished.   Sounds congested and coughing off and on during the exam.   HENT:   Head: Normocephalic and atraumatic.   Right Ear: Hearing, tympanic membrane, external ear and ear canal normal.   Left Ear: Hearing, tympanic membrane, external ear and ear canal normal.   Nose: Nose normal. Right sinus exhibits no maxillary sinus tenderness and no frontal sinus tenderness. Left sinus exhibits no maxillary sinus tenderness and no frontal sinus " tenderness.   Mouth/Throat: Uvula is midline, oropharynx is clear and moist and mucous membranes are normal.   Eyes: Conjunctivae, EOM and lids are normal. Pupils are equal, round, and reactive to light.   Neck: Trachea normal and phonation normal. Neck supple. Carotid bruit is not present. No edema present.   Cardiovascular: Normal rate, regular rhythm, S1 normal, S2 normal, normal heart sounds and normal pulses.   No murmur heard.  Pulmonary/Chest: Effort normal. She has rhonchi in the right upper field and the left upper field.   Abdominal: Soft. Normal appearance, normal aorta and bowel sounds are normal. There is no hepatomegaly. There is no tenderness.   Lymphadenopathy:     She has no cervical adenopathy.   Neurological: She is alert and oriented to person, place, and time.   Skin: Skin is warm, dry and intact. Capillary refill takes less than 2 seconds.   Psychiatric: She has a normal mood and affect. Her speech is normal and behavior is normal. Judgment and thought content normal. Cognition and memory are normal.       Assessment/Plan   Sanjana was seen today for follow-up, uri and sore throat.    Diagnoses and all orders for this visit:    Wheezing  -     ipratropium-albuterol (DUO-NEB) nebulizer solution 3 mL  -     ipratropium-albuterol (DUO-NEB) 0.5-2.5 mg/3 ml nebulizer; Take 3 mL by nebulization Every 4 (Four) Hours As Needed for Wheezing.    Cough  -     ipratropium-albuterol (DUO-NEB) nebulizer solution 3 mL  -     ipratropium-albuterol (DUO-NEB) 0.5-2.5 mg/3 ml nebulizer; Take 3 mL by nebulization Every 4 (Four) Hours As Needed for Wheezing.    Lung nodule < 6cm on CT  -     CT Chest Without Contrast; Future    1.  Continuing cough with them.  I have reviewed her [facility] ER reports from July 16,22 and 27,2019, laboratory results, EKG and imaging findings with her at office visit today.  She was seen again at the hospital on July 27, 2019.  He had a Duo Neb nebulizer treatment in office.   She did have improvement.  She was given a nebulizer in office to take home.  I have prescribed Duo Neb nebulizer solution to her pharmacy.  She will finish her antibiotic medication prescribed by the ER physicians.  She will keep her follow-up appointment with her pulmonologist.  Sanjana  will return to office if no improvement.  2.  New lung nodule on CT: She did have a CTA on July 22, 2019 that showed a small lung nodule.  Radiologist recommends follow-up in 6-12 months.  I have placed the order.  She will keep her appointment with her pulmonologist.    [facility]  [address] [address]  Dept: [phone]  Dept Fax: [phone]  Loc: [phone]  Loc Fax: [phone]

## 2019-07-30 PROBLEM — R05.9 COUGH: Status: ACTIVE | Noted: 2019-07-30

## 2019-07-30 PROBLEM — R06.2 WHEEZING: Status: ACTIVE | Noted: 2019-07-30

## 2019-07-30 PROBLEM — IMO0001 LUNG NODULE < 6CM ON CT: Status: ACTIVE | Noted: 2019-07-30

## 2019-07-31 ENCOUNTER — TELEPHONE (OUTPATIENT)
Dept: FAMILY MEDICINE CLINIC | Facility: CLINIC | Age: 74
End: 2019-07-31

## 2019-07-31 DIAGNOSIS — R05.9 COUGH: ICD-10-CM

## 2019-07-31 DIAGNOSIS — R06.2 WHEEZING: ICD-10-CM

## 2019-07-31 DIAGNOSIS — IMO0001 LUNG NODULE < 6CM ON CT: Primary | ICD-10-CM

## 2019-07-31 NOTE — TELEPHONE ENCOUNTER
She has an appointment with Dr. Brooks, pulmonologist to [place] in early September.  She can call him to see if he has an earlier appointment or we can try with [facility] pulmonary.  Please let me know if she would like referral to pulmonologist affiliated with [facility].    Meanwhile have her continue her medications and nebulizer treatment.

## 2019-07-31 NOTE — TELEPHONE ENCOUNTER
Pt calling, states she is not feeling much better since her appt earlier this week.  Pt states she has a significant cough, and her nebulizer treatments haven't been helping much.     Pt would like to know if there is anything else she can do? Or if she should try to get setup with a pulmonologist?    Please advise.

## 2019-08-02 ENCOUNTER — TELEPHONE (OUTPATIENT)
Dept: FAMILY MEDICINE CLINIC | Facility: CLINIC | Age: 74
End: 2019-08-02

## 2019-08-02 DIAGNOSIS — R06.2 WHEEZING: Primary | ICD-10-CM

## 2019-08-02 DIAGNOSIS — R05.9 COUGH: ICD-10-CM

## 2019-08-02 DIAGNOSIS — J20.9 ACUTE BRONCHITIS, UNSPECIFIED ORGANISM: ICD-10-CM

## 2019-08-02 RX ORDER — DOXYCYCLINE HYCLATE 100 MG
100 TABLET ORAL 2 TIMES DAILY
Qty: 20 TABLET | Refills: 0 | Status: SHIPPED | OUTPATIENT
Start: 2019-08-02 | End: 2019-08-22

## 2019-08-02 RX ORDER — DEXTROMETHORPHAN HYDROBROMIDE AND PROMETHAZINE HYDROCHLORIDE 15; 6.25 MG/5ML; MG/5ML
5 SYRUP ORAL 4 TIMES DAILY PRN
Qty: 118 ML | Refills: 0 | Status: SHIPPED | OUTPATIENT
Start: 2019-08-02 | End: 2020-02-14

## 2019-08-02 NOTE — TELEPHONE ENCOUNTER
Notify patient that I have sent doxycycline antibiotic and promethazine/dextromethorphan cough syrup to pharmacy.  If no improvement, have her return to office or go to the ER for further evaluation.  Have her continue her nebulizer treatment.

## 2019-08-08 ENCOUNTER — TELEPHONE (OUTPATIENT)
Dept: FAMILY MEDICINE CLINIC | Facility: CLINIC | Age: 74
End: 2019-08-08

## 2019-08-08 NOTE — TELEPHONE ENCOUNTER
That will be fine if she is feeling better.  I have reviewed her hospital reports.  Is she feeling better?

## 2019-08-08 NOTE — TELEPHONE ENCOUNTER
Pt calling, states she was in the hospital, and was told to followup. She is seeing pulmonology.   Pt would like to know if she can simply followup with you on the 22nd at the time of her appt she already has scheduled, or if you would like to see her sooner?    Please advise.

## 2019-08-22 ENCOUNTER — OFFICE VISIT (OUTPATIENT)
Dept: FAMILY MEDICINE CLINIC | Facility: CLINIC | Age: 74
End: 2019-08-22

## 2019-08-22 VITALS
HEIGHT: 68 IN | DIASTOLIC BLOOD PRESSURE: 70 MMHG | OXYGEN SATURATION: 98 % | HEART RATE: 74 BPM | RESPIRATION RATE: 16 BRPM | TEMPERATURE: 98 F | WEIGHT: 213 LBS | SYSTOLIC BLOOD PRESSURE: 120 MMHG | BODY MASS INDEX: 32.28 KG/M2

## 2019-08-22 DIAGNOSIS — E78.00 HYPERCHOLESTEROLEMIA: ICD-10-CM

## 2019-08-22 DIAGNOSIS — Z78.0 POSTMENOPAUSE: ICD-10-CM

## 2019-08-22 DIAGNOSIS — J42 CHRONIC BRONCHITIS, UNSPECIFIED CHRONIC BRONCHITIS TYPE (HCC): ICD-10-CM

## 2019-08-22 DIAGNOSIS — Z00.00 MEDICARE ANNUAL WELLNESS VISIT, SUBSEQUENT: Primary | ICD-10-CM

## 2019-08-22 DIAGNOSIS — I10 ESSENTIAL HYPERTENSION: ICD-10-CM

## 2019-08-22 DIAGNOSIS — E66.9 OBESITY (BMI 30-39.9): ICD-10-CM

## 2019-08-22 PROBLEM — J44.1 COPD EXACERBATION: Status: ACTIVE | Noted: 2019-08-04

## 2019-08-22 PROCEDURE — 96160 PT-FOCUSED HLTH RISK ASSMT: CPT | Performed by: PHYSICIAN ASSISTANT

## 2019-08-22 PROCEDURE — G0439 PPPS, SUBSEQ VISIT: HCPCS | Performed by: PHYSICIAN ASSISTANT

## 2019-08-22 RX ORDER — MONTELUKAST SODIUM 10 MG/1
TABLET ORAL
COMMUNITY
Start: 2019-08-06 | End: 2020-02-14

## 2019-08-22 RX ORDER — FLUTICASONE PROPIONATE 50 MCG
2 SPRAY, SUSPENSION (ML) NASAL DAILY
COMMUNITY
Start: 2019-08-07 | End: 2020-02-14

## 2019-08-22 RX ORDER — BUDESONIDE AND FORMOTEROL FUMARATE DIHYDRATE 160; 4.5 UG/1; UG/1
2 AEROSOL RESPIRATORY (INHALATION)
COMMUNITY
Start: 2019-08-06 | End: 2020-02-14

## 2019-08-22 RX ORDER — LISINOPRIL 5 MG/1
5 TABLET ORAL DAILY
Qty: 90 TABLET | Refills: 3 | Status: SHIPPED | OUTPATIENT
Start: 2019-08-22 | End: 2020-08-24

## 2019-08-22 RX ORDER — LORATADINE 10 MG/1
10 TABLET ORAL DAILY
COMMUNITY
Start: 2019-08-07 | End: 2020-02-14

## 2019-08-22 NOTE — PROGRESS NOTES
The ABCs of the Annual Wellness Visit  Subsequent Medicare Wellness Visit    Chief Complaint   Patient presents with   • Medicare Wellness-subsequent     awv       Subjective   History of Present Illness:  Sanjana Morse is a 74 y.o. female who presents for a Subsequent Medicare Wellness Visit.  Sanjana has been in and out of the emergency room in hospital at [facility] for COPD issues.  Currently under the care of Dr. Tavarez, [place] pulmonologist.  She is doing well on her current medications and inhalers.  She has a follow-up appointment with his office in a few weeks.  Sanjana states she is feeling well at office visit today.  Denied any chest pain, shortness of air, wheezing, fevers, chills, cough, upper respiratory symptoms, nausea, vomiting, diarrhea, swelling of ankles, urinary symptoms or abdominal pain.  Bowel movements have been daily for her.  Denied any dark black tarry stools.  Appetite is improving.  She is trying to eat healthier by decreasing carbohydrates.  Sleep has been normal.  She has not been physically active over the past month due to her breathing issues.  She has no complaints at office visit today.    HEALTH RISK ASSESSMENT    Recent Hospitalizations:  Recently treated at the following:  Other: [facility] Admiittedon 8/3/19 discharge on 8/6/2019    Current Medical Providers:  Patient Care Team:  Reyna Singh PA-C as PCP - General    Smoking Status:  Social History     Tobacco Use   Smoking Status Former Smoker       Alcohol Consumption:  Social History     Substance and Sexual Activity   Alcohol Use Not on file       Depression Screen:   PHQ-2/PHQ-9 Depression Screening 8/22/2019   Little interest or pleasure in doing things 0   Feeling down, depressed, or hopeless 0   Trouble falling or staying asleep, or sleeping too much -   Feeling tired or having little energy -   Poor appetite or overeating -   Feeling bad about yourself - or that you are a failure or  have let yourself or your family down -   Trouble concentrating on things, such as reading the newspaper or watching television -   Moving or speaking so slowly that other people could have noticed. Or the opposite - being so fidgety or restless that you have been moving around a lot more than usual -   Thoughts that you would be better off dead, or of hurting yourself in some way -   Total Score 0   If you checked off any problems, how difficult have these problems made it for you to do your work, take care of things at home, or get along with other people? -       Fall Risk Screen:  EZEQUIEL Fall Risk Assessment was completed, and patient is at LOW risk for falls.Assessment completed on:8/22/2019    Health Habits and Functional and Cognitive Screening:  Functional & Cognitive Status 8/3/2018   Do you have difficulty preparing food and eating? No   Do you have difficulty bathing yourself, getting dressed or grooming yourself? No   Do you have difficulty using the toilet? No   Do you have difficulty moving around from place to place? No   Do you have trouble with steps or getting out of a bed or a chair? No   Current Diet Well Balanced Diet   Dental Exam Up to date   Eye Exam Up to date   Exercise (times per week) 5 times per week   Current Exercise Activities Include Walking   Do you need help using the phone?  No   Are you deaf or do you have serious difficulty hearing?  No   Do you need help with transportation? No   Do you need help shopping? No   Do you need help preparing meals?  No   Do you need help with housework?  No   Do you need help with laundry? No   Do you need help taking your medications? No   Do you need help managing money? No   Do you ever drive or ride in a car without wearing a seat belt? No   Have you felt unusual stress, anger or loneliness in the last month? No   Who do you live with? Spouse   If you need help, do you have trouble finding someone available to you? No   Have you been bothered  in the last four weeks by sexual problems? No   Do you have difficulty concentrating, remembering or making decisions? No       Current outpatient and discharge medications have been reconciled for the patient.  Reviewed by: Reyna Singh PA-C  Does the patient have evidence of cognitive impairment? No    Asprin use counseling:Does not need ASA but is currently taking (advised patient that ASA is not indicated and patient chooses to stop it)    Age-appropriate Screening Schedule:  Refer to the list below for future screening recommendations based on patient's age, sex and/or medical conditions. Orders for these recommended tests are listed in the plan section. The patient has been provided with a written plan.    Health Maintenance   Topic Date Due   • ZOSTER VACCINE (2 of 3) 01/26/2017   • DXA SCAN  12/02/2018   • LIPID PANEL  08/03/2019   • INFLUENZA VACCINE  09/16/2019 (Originally 8/1/2019)   • COLONOSCOPY  09/15/2019   • MAMMOGRAM  09/26/2020   • TDAP/TD VACCINES (2 - Td) 11/28/2026   • PNEUMOCOCCAL VACCINES (65+ LOW/MEDIUM RISK)  Completed          The following portions of the patient's history were reviewed and updated as appropriate:   She  has no past medical history on file.  She does not have any pertinent problems on file.  She  has a past surgical history that includes Knee arthroscopy (Left, 03/05/2019).  Her family history is not on file.  She  reports that she has quit smoking. She does not have any smokeless tobacco history on file. Her alcohol and drug histories are not on file.  Current Outpatient Medications   Medication Sig Dispense Refill   • albuterol sulfate  (90 Base) MCG/ACT inhaler Inhale 2 puffs.     • budesonide-formoterol (SYMBICORT) 160-4.5 MCG/ACT inhaler Inhale 2 puffs.     • Cholecalciferol (VITAMIN D) 2000 units capsule Take  by mouth Daily.     • fluticasone (FLONASE) 50 MCG/ACT nasal spray 2 sprays into the nostril(s) as directed by provider Daily.     •  ipratropium-albuterol (DUO-NEB) 0.5-2.5 mg/3 ml nebulizer Take 3 mL by nebulization Every 4 (Four) Hours As Needed for Wheezing. 25 vial 6   • lisinopril (PRINIVIL,ZESTRIL) 5 MG tablet Take 1 tablet by mouth Daily. 90 tablet 3   • loratadine (CLARITIN) 10 MG tablet Take 10 mg by mouth Daily.     • promethazine-dextromethorphan (PROMETHAZINE-DM) 6.25-15 MG/5ML syrup Take 5 mL by mouth 4 (Four) Times a Day As Needed for Cough. 118 mL 0   • tiotropium bromide monohydrate (SPIRIVA RESPIMAT) 2.5 MCG/ACT aerosol solution inhaler Inhale Daily.     • montelukast (SINGULAIR) 10 MG tablet        No current facility-administered medications for this visit.      Current Outpatient Medications on File Prior to Visit   Medication Sig   • albuterol sulfate  (90 Base) MCG/ACT inhaler Inhale 2 puffs.   • budesonide-formoterol (SYMBICORT) 160-4.5 MCG/ACT inhaler Inhale 2 puffs.   • Cholecalciferol (VITAMIN D) 2000 units capsule Take  by mouth Daily.   • fluticasone (FLONASE) 50 MCG/ACT nasal spray 2 sprays into the nostril(s) as directed by provider Daily.   • ipratropium-albuterol (DUO-NEB) 0.5-2.5 mg/3 ml nebulizer Take 3 mL by nebulization Every 4 (Four) Hours As Needed for Wheezing.   • loratadine (CLARITIN) 10 MG tablet Take 10 mg by mouth Daily.   • promethazine-dextromethorphan (PROMETHAZINE-DM) 6.25-15 MG/5ML syrup Take 5 mL by mouth 4 (Four) Times a Day As Needed for Cough.   • tiotropium bromide monohydrate (SPIRIVA RESPIMAT) 2.5 MCG/ACT aerosol solution inhaler Inhale Daily.   • montelukast (SINGULAIR) 10 MG tablet      No current facility-administered medications on file prior to visit.      She is allergic to hydrocodone..    Outpatient Medications Prior to Visit   Medication Sig Dispense Refill   • albuterol sulfate  (90 Base) MCG/ACT inhaler Inhale 2 puffs.     • budesonide-formoterol (SYMBICORT) 160-4.5 MCG/ACT inhaler Inhale 2 puffs.     • Cholecalciferol (VITAMIN D) 2000 units capsule Take  by mouth  Daily.     • fluticasone (FLONASE) 50 MCG/ACT nasal spray 2 sprays into the nostril(s) as directed by provider Daily.     • ipratropium-albuterol (DUO-NEB) 0.5-2.5 mg/3 ml nebulizer Take 3 mL by nebulization Every 4 (Four) Hours As Needed for Wheezing. 25 vial 6   • loratadine (CLARITIN) 10 MG tablet Take 10 mg by mouth Daily.     • promethazine-dextromethorphan (PROMETHAZINE-DM) 6.25-15 MG/5ML syrup Take 5 mL by mouth 4 (Four) Times a Day As Needed for Cough. 118 mL 0   • tiotropium bromide monohydrate (SPIRIVA RESPIMAT) 2.5 MCG/ACT aerosol solution inhaler Inhale Daily.     • doxycycline (VIBRAMYICN) 100 MG tablet Take 1 tablet by mouth 2 (Two) Times a Day. 20 tablet 0   • lisinopril (PRINIVIL,ZESTRIL) 5 MG tablet Take 1 tablet by mouth Daily. 90 tablet 3   • predniSONE (DELTASONE) 20 MG tablet Take 20 mg by mouth.     • montelukast (SINGULAIR) 10 MG tablet        No facility-administered medications prior to visit.        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 Have asked the patient to send this to us to add to record    Review of Systems   Constitutional: Negative.  Negative for chills, fatigue and fever.   HENT: Negative.    Eyes: Negative.    Respiratory: Negative.  Negative for cough, shortness of  "breath and wheezing.    Cardiovascular: Negative.  Negative for chest pain, palpitations and leg swelling.   Gastrointestinal: Negative.  Negative for abdominal pain, anal bleeding, blood in stool, constipation, diarrhea, nausea, rectal pain and vomiting.   Endocrine: Negative.    Genitourinary: Negative.  Negative for dysuria, frequency, hematuria and urgency.   Musculoskeletal: Negative.    Skin: Negative.    Allergic/Immunologic: Negative.    Neurological: Negative.  Negative for dizziness, light-headedness and headaches.   Hematological: Negative.    Psychiatric/Behavioral: Negative.  Negative for sleep disturbance and suicidal ideas.   All other systems reviewed and are negative.      Compared to one year ago, the patient feels her physical health is the same.  Compared to one year ago, the patient feels her mental health is the same.    Reviewed chart for potential of high risk medication in the elderly: yes  Reviewed chart for potential of harmful drug interactions in the elderly:yes    Objective         Vitals:    08/22/19 0958   BP: 120/70   Pulse: 74   Resp: 16   Temp: 98 °F (36.7 °C)   SpO2: 98%   Weight: 96.6 kg (213 lb)   Height: 172.7 cm (68\")       Body mass index is 32.39 kg/m².  Discussed the patient's BMI with her. The BMI is above average; BMI management plan is completed.    Physical Exam   Constitutional: She is oriented to person, place, and time. Vital signs are normal. She appears well-developed and well-nourished.   Obese female   HENT:   Head: Normocephalic and atraumatic.   Right Ear: Hearing, tympanic membrane, external ear and ear canal normal.   Left Ear: Hearing, tympanic membrane, external ear and ear canal normal.   Nose: Nose normal. Right sinus exhibits no maxillary sinus tenderness and no frontal sinus tenderness. Left sinus exhibits no maxillary sinus tenderness and no frontal sinus tenderness.   Mouth/Throat: Uvula is midline, oropharynx is clear and moist and mucous membranes " are normal.   Eyes: Conjunctivae, EOM and lids are normal. Pupils are equal, round, and reactive to light.   Neck: Trachea normal and phonation normal. Neck supple. Carotid bruit is not present. No edema present. No thyroid mass and no thyromegaly present.   Cardiovascular: Normal rate, regular rhythm, S1 normal, S2 normal, normal heart sounds and normal pulses.   No murmur heard.  Pulmonary/Chest: Effort normal and breath sounds normal.   Abdominal: Soft. Normal appearance, normal aorta and bowel sounds are normal. There is no hepatomegaly. There is no tenderness.   Lymphadenopathy:     She has no cervical adenopathy.   Neurological: She is alert and oriented to person, place, and time.   Reflex Scores:       Patellar reflexes are 2+ on the right side and 2+ on the left side.  Skin: Skin is warm, dry and intact. Capillary refill takes less than 2 seconds.   Psychiatric: She has a normal mood and affect. Her speech is normal and behavior is normal. Judgment and thought content normal. Cognition and memory are normal.       Lab Results   Component Value Date     (H) 08/06/2019        Assessment/Plan   Medicare Risks and Personalized Health Plan  CMS Preventative Services Quick Reference  Obesity/Overweight   Osteoprorosis Risk    The above risks/problems have been discussed with the patient.  Pertinent information has been shared with the patient in the After Visit Summary.  Follow up plans and orders are seen below in the Assessment/Plan Section.    Diagnoses and all orders for this visit:    1. Medicare annual wellness visit, subsequent (Primary)    2. Essential hypertension  -     lisinopril (PRINIVIL,ZESTRIL) 5 MG tablet; Take 1 tablet by mouth Daily.  Dispense: 90 tablet; Refill: 3  -     CBC & Differential; Future  -     Comprehensive Metabolic Panel; Future  -     Lipid Panel With LDL / HDL Ratio; Future    3. Hypercholesterolemia  -     Comprehensive Metabolic Panel; Future  -     Lipid Panel With LDL  / HDL Ratio; Future    4. Chronic bronchitis, unspecified chronic bronchitis type (CMS/HCC)    5. Obesity (BMI 30-39.9)    6. Postmenopause  -     DEXA Bone Density Axial; Future    1.  Annual sequential Medicare [employer] physical with hypertension: I have rechecked blood pressure at office visit today and got 120/70 in left arm.  I have refilled her lisinopril to pharmacy.  She will return to office for fasting blood work in the next few weeks.  2.  Chronic and stable hypercholesterolemia: She will return to office for CBC, CMP and a lipid profile.  Sanjana will be notified of test results when completed.  3.  Chronic and improving COPD: Doing well with her current inhalers.  She is under the care of pulmonologist, Dr. Tavarez at [place] [facility].  She will keep her follow-up appointments.  4.  Chronic obesity: She has gained 2 pounds since her last office visit in July.  I have encouraged Sanjana to decrease her fatty food and carbohydrate intake.  Also encouraged to increase physical activity to help with weight loss.  Will reevaluate weight at next office visit.  5.  Chronic and stable postmenopausal female: She is due for DEXA scan.  I have placed orders for the [facility] facility.  She will be notified of test results when completed.  Follow Up:  No Follow-up on file.     An After Visit Summary and PPPS were given to the patient.

## 2019-08-28 ENCOUNTER — APPOINTMENT (OUTPATIENT)
Dept: BONE DENSITY | Facility: HOSPITAL | Age: 74
End: 2019-08-28

## 2019-08-28 LAB
ALBUMIN SERPL-MCNC: 4 G/DL (ref 3.5–5.2)
ALBUMIN/GLOB SERPL: 2.1 G/DL
ALP SERPL-CCNC: 57 U/L (ref 39–117)
ALT SERPL-CCNC: 31 U/L (ref 1–33)
AST SERPL-CCNC: 21 U/L (ref 1–32)
BASOPHILS # BLD AUTO: 0.03 10*3/MM3 (ref 0–0.2)
BASOPHILS NFR BLD AUTO: 0.5 % (ref 0–1.5)
BILIRUB SERPL-MCNC: 0.6 MG/DL (ref 0.2–1.2)
BUN SERPL-MCNC: 16 MG/DL (ref 8–23)
BUN/CREAT SERPL: 21.1 (ref 7–25)
CALCIUM SERPL-MCNC: 9 MG/DL (ref 8.6–10.5)
CHLORIDE SERPL-SCNC: 104 MMOL/L (ref 98–107)
CHOLEST SERPL-MCNC: 222 MG/DL (ref 0–200)
CO2 SERPL-SCNC: 28.9 MMOL/L (ref 22–29)
CREAT SERPL-MCNC: 0.76 MG/DL (ref 0.57–1)
EOSINOPHIL # BLD AUTO: 0.12 10*3/MM3 (ref 0–0.4)
EOSINOPHIL NFR BLD AUTO: 2 % (ref 0.3–6.2)
ERYTHROCYTE [DISTWIDTH] IN BLOOD BY AUTOMATED COUNT: 13 % (ref 12.3–15.4)
GLOBULIN SER CALC-MCNC: 1.9 GM/DL
GLUCOSE SERPL-MCNC: 95 MG/DL (ref 65–99)
HCT VFR BLD AUTO: 44.3 % (ref 34–46.6)
HDLC SERPL-MCNC: 73 MG/DL (ref 40–60)
HGB BLD-MCNC: 13.6 G/DL (ref 12–15.9)
IMM GRANULOCYTES # BLD AUTO: 0.02 10*3/MM3 (ref 0–0.05)
IMM GRANULOCYTES NFR BLD AUTO: 0.3 % (ref 0–0.5)
LDLC SERPL CALC-MCNC: 126 MG/DL (ref 0–100)
LDLC/HDLC SERPL: 1.73 {RATIO}
LYMPHOCYTES # BLD AUTO: 2.64 10*3/MM3 (ref 0.7–3.1)
LYMPHOCYTES NFR BLD AUTO: 44.1 % (ref 19.6–45.3)
MCH RBC QN AUTO: 30.7 PG (ref 26.6–33)
MCHC RBC AUTO-ENTMCNC: 30.7 G/DL (ref 31.5–35.7)
MCV RBC AUTO: 100 FL (ref 79–97)
MONOCYTES # BLD AUTO: 0.61 10*3/MM3 (ref 0.1–0.9)
MONOCYTES NFR BLD AUTO: 10.2 % (ref 5–12)
NEUTROPHILS # BLD AUTO: 2.56 10*3/MM3 (ref 1.7–7)
NEUTROPHILS NFR BLD AUTO: 42.9 % (ref 42.7–76)
NRBC BLD AUTO-RTO: 0 /100 WBC (ref 0–0.2)
PLATELET # BLD AUTO: 211 10*3/MM3 (ref 140–450)
POTASSIUM SERPL-SCNC: 4.7 MMOL/L (ref 3.5–5.2)
PROT SERPL-MCNC: 5.9 G/DL (ref 6–8.5)
RBC # BLD AUTO: 4.43 10*6/MM3 (ref 3.77–5.28)
SODIUM SERPL-SCNC: 143 MMOL/L (ref 136–145)
TRIGL SERPL-MCNC: 115 MG/DL (ref 0–150)
VLDLC SERPL CALC-MCNC: 23 MG/DL
WBC # BLD AUTO: 5.98 10*3/MM3 (ref 3.4–10.8)

## 2019-08-29 ENCOUNTER — RESULTS ENCOUNTER (OUTPATIENT)
Dept: FAMILY MEDICINE CLINIC | Facility: CLINIC | Age: 74
End: 2019-08-29

## 2019-08-29 DIAGNOSIS — I10 ESSENTIAL HYPERTENSION: ICD-10-CM

## 2019-08-29 DIAGNOSIS — E78.00 HYPERCHOLESTEROLEMIA: ICD-10-CM

## 2019-09-03 ENCOUNTER — HOSPITAL ENCOUNTER (OUTPATIENT)
Dept: BONE DENSITY | Facility: HOSPITAL | Age: 74
Discharge: HOME | End: 2019-09-03
Admitting: PHYSICIAN ASSISTANT

## 2019-09-03 DIAGNOSIS — Z78.0 POSTMENOPAUSE: ICD-10-CM

## 2019-09-03 PROCEDURE — 77080 DXA BONE DENSITY AXIAL: CPT

## 2019-12-13 ENCOUNTER — TELEPHONE (OUTPATIENT)
Dept: FAMILY MEDICINE CLINIC | Facility: CLINIC | Age: 74
End: 2019-12-13

## 2019-12-13 NOTE — TELEPHONE ENCOUNTER
Pt stated that she would like a 2nd opinion from you on if she should see a GI doctor regarding her reflux. She stated that

## 2020-02-14 ENCOUNTER — OFFICE VISIT (OUTPATIENT)
Dept: FAMILY MEDICINE CLINIC | Facility: CLINIC | Age: 75
End: 2020-02-14

## 2020-02-14 ENCOUNTER — TELEPHONE (OUTPATIENT)
Dept: FAMILY MEDICINE CLINIC | Facility: CLINIC | Age: 75
End: 2020-02-14

## 2020-02-14 VITALS
TEMPERATURE: 98 F | OXYGEN SATURATION: 97 % | WEIGHT: 222 LBS | RESPIRATION RATE: 16 BRPM | BODY MASS INDEX: 33.65 KG/M2 | HEIGHT: 68 IN | DIASTOLIC BLOOD PRESSURE: 68 MMHG | SYSTOLIC BLOOD PRESSURE: 130 MMHG | HEART RATE: 86 BPM

## 2020-02-14 DIAGNOSIS — E78.00 HYPERCHOLESTEROLEMIA: ICD-10-CM

## 2020-02-14 DIAGNOSIS — I10 ESSENTIAL HYPERTENSION: Primary | ICD-10-CM

## 2020-02-14 DIAGNOSIS — Z12.31 SCREENING MAMMOGRAM, ENCOUNTER FOR: ICD-10-CM

## 2020-02-14 DIAGNOSIS — Z12.11 ENCOUNTER FOR SCREENING COLONOSCOPY: ICD-10-CM

## 2020-02-14 DIAGNOSIS — Z23 NEED FOR SHINGLES VACCINE: ICD-10-CM

## 2020-02-14 PROCEDURE — 99214 OFFICE O/P EST MOD 30 MIN: CPT | Performed by: PHYSICIAN ASSISTANT

## 2020-02-14 RX ORDER — MULTIVIT AND MINERALS-FERROUS GLUCONATE 9 MG IRON/15 ML ORAL LIQUID 9 MG/15 ML
LIQUID (ML) ORAL DAILY
COMMUNITY
End: 2021-02-24

## 2020-02-14 RX ORDER — PANTOPRAZOLE SODIUM 40 MG/1
40 TABLET, DELAYED RELEASE ORAL DAILY
COMMUNITY
End: 2020-04-13

## 2020-02-14 NOTE — TELEPHONE ENCOUNTER
Pt calling, states she is due for colonoscopy, and forgot to mention at todays appt. Requests referral.

## 2020-02-14 NOTE — TELEPHONE ENCOUNTER
Does she recall the physician who did her last colonoscopy?  If not, would she like to see someone in the [place] or [place] area?

## 2020-02-17 ENCOUNTER — TELEPHONE (OUTPATIENT)
Dept: FAMILY MEDICINE CLINIC | Facility: CLINIC | Age: 75
End: 2020-02-17

## 2020-02-17 DIAGNOSIS — Z12.31 SCREENING MAMMOGRAM, ENCOUNTER FOR: Primary | ICD-10-CM

## 2020-02-17 NOTE — TELEPHONE ENCOUNTER
Pt calling, has scheduled herself for a mammogram at [place] thursday morning.    Order Needed.    [place] fax [phone]      [place] mammo ordered  And printed

## 2020-02-21 ENCOUNTER — RESULTS ENCOUNTER (OUTPATIENT)
Dept: FAMILY MEDICINE CLINIC | Facility: CLINIC | Age: 75
End: 2020-02-21

## 2020-02-21 DIAGNOSIS — E78.00 HYPERCHOLESTEROLEMIA: ICD-10-CM

## 2020-02-26 LAB
ALBUMIN SERPL-MCNC: 4.1 G/DL (ref 3.5–5.2)
ALBUMIN/GLOB SERPL: 1.7 G/DL
ALP SERPL-CCNC: 60 U/L (ref 39–117)
ALT SERPL-CCNC: 22 U/L (ref 1–33)
AST SERPL-CCNC: 19 U/L (ref 1–32)
BASOPHILS # BLD AUTO: 0.05 10*3/MM3 (ref 0–0.2)
BASOPHILS NFR BLD AUTO: 0.9 % (ref 0–1.5)
BILIRUB SERPL-MCNC: 0.4 MG/DL (ref 0.2–1.2)
BUN SERPL-MCNC: 19 MG/DL (ref 8–23)
BUN/CREAT SERPL: 21.3 (ref 7–25)
CALCIUM SERPL-MCNC: 9.5 MG/DL (ref 8.6–10.5)
CHLORIDE SERPL-SCNC: 99 MMOL/L (ref 98–107)
CHOLEST SERPL-MCNC: 193 MG/DL (ref 0–200)
CO2 SERPL-SCNC: 28.4 MMOL/L (ref 22–29)
CREAT SERPL-MCNC: 0.89 MG/DL (ref 0.57–1)
EOSINOPHIL # BLD AUTO: 0.15 10*3/MM3 (ref 0–0.4)
EOSINOPHIL NFR BLD AUTO: 2.7 % (ref 0.3–6.2)
ERYTHROCYTE [DISTWIDTH] IN BLOOD BY AUTOMATED COUNT: 12.2 % (ref 12.3–15.4)
GLOBULIN SER CALC-MCNC: 2.4 GM/DL
GLUCOSE SERPL-MCNC: 105 MG/DL (ref 65–99)
HCT VFR BLD AUTO: 40.3 % (ref 34–46.6)
HDLC SERPL-MCNC: 61 MG/DL (ref 40–60)
HGB BLD-MCNC: 13.4 G/DL (ref 12–15.9)
IMM GRANULOCYTES # BLD AUTO: 0.02 10*3/MM3 (ref 0–0.05)
IMM GRANULOCYTES NFR BLD AUTO: 0.4 % (ref 0–0.5)
LDLC SERPL CALC-MCNC: 111 MG/DL (ref 0–100)
LDLC/HDLC SERPL: 1.83 {RATIO}
LYMPHOCYTES # BLD AUTO: 2.19 10*3/MM3 (ref 0.7–3.1)
LYMPHOCYTES NFR BLD AUTO: 40 % (ref 19.6–45.3)
MCH RBC QN AUTO: 29.8 PG (ref 26.6–33)
MCHC RBC AUTO-ENTMCNC: 33.3 G/DL (ref 31.5–35.7)
MCV RBC AUTO: 89.8 FL (ref 79–97)
MONOCYTES # BLD AUTO: 0.45 10*3/MM3 (ref 0.1–0.9)
MONOCYTES NFR BLD AUTO: 8.2 % (ref 5–12)
NEUTROPHILS # BLD AUTO: 2.62 10*3/MM3 (ref 1.7–7)
NEUTROPHILS NFR BLD AUTO: 47.8 % (ref 42.7–76)
NRBC BLD AUTO-RTO: 0 /100 WBC (ref 0–0.2)
PLATELET # BLD AUTO: 252 10*3/MM3 (ref 140–450)
POTASSIUM SERPL-SCNC: 4.2 MMOL/L (ref 3.5–5.2)
PROT SERPL-MCNC: 6.5 G/DL (ref 6–8.5)
RBC # BLD AUTO: 4.49 10*6/MM3 (ref 3.77–5.28)
SODIUM SERPL-SCNC: 140 MMOL/L (ref 136–145)
TRIGL SERPL-MCNC: 103 MG/DL (ref 0–150)
VLDLC SERPL CALC-MCNC: 20.6 MG/DL
WBC # BLD AUTO: 5.48 10*3/MM3 (ref 3.4–10.8)

## 2020-03-17 ENCOUNTER — TELEPHONE (OUTPATIENT)
Dept: FAMILY MEDICINE CLINIC | Facility: CLINIC | Age: 75
End: 2020-03-17

## 2020-03-17 ENCOUNTER — OFFICE VISIT (OUTPATIENT)
Dept: FAMILY MEDICINE CLINIC | Facility: CLINIC | Age: 75
End: 2020-03-17

## 2020-03-17 VITALS
BODY MASS INDEX: 33.65 KG/M2 | SYSTOLIC BLOOD PRESSURE: 130 MMHG | DIASTOLIC BLOOD PRESSURE: 70 MMHG | HEIGHT: 68 IN | WEIGHT: 222 LBS | TEMPERATURE: 98.7 F | HEART RATE: 84 BPM | OXYGEN SATURATION: 96 % | RESPIRATION RATE: 16 BRPM

## 2020-03-17 DIAGNOSIS — R10.32 INTERMITTENT LEFT LOWER QUADRANT ABDOMINAL PAIN: Primary | ICD-10-CM

## 2020-03-17 PROCEDURE — 99213 OFFICE O/P EST LOW 20 MIN: CPT | Performed by: NURSE PRACTITIONER

## 2020-03-17 NOTE — PROGRESS NOTES
Patient ID: Sanjana Morse is a 75 y.o. female     Subjective     Chief Complaint   Patient presents with   • Abdominal Pain     Left lower       History of Present Illness    Sanjana Morse presents to the office today for complaints of left lower quadrant abdominal pain.  Symptoms started approximately 2 days ago however today her symptoms have improved.  She thought this was related to diverticulitis.  She states on previous colonoscopy she had diverticulosis.  She has not had any constipation, diarrhea, fever, melena.  Nausea, or vomiting.  No problems urinating.  No other symptoms.  Last BM this morning.  History of diverticulosis on previous colonoscopy in 2009. Has upcoming colonoscopy with Dr. Bunch on 3/30/2020.    She denies any complaints of fever, chills, cough, chest pain, shortness of air, nausea, or any other concerns.     The following portions of the patient's history were reviewed and updated as appropriate: allergies, current medications, past family history, past medical history, past social history, past surgical history and problem list.       Review of Systems   Constitution: Negative. Negative for fever.   HENT: Negative.    Eyes: Negative.    Cardiovascular: Negative.    Respiratory: Negative.    Endocrine: Negative.    Hematologic/Lymphatic: Negative.    Skin: Negative.    Musculoskeletal: Negative.    Gastrointestinal: Positive for abdominal pain. Negative for melena, nausea and vomiting.   Genitourinary: Negative.    Neurological: Negative.    Psychiatric/Behavioral: Negative.        Vitals:    03/17/20 1554   BP: 130/70   Pulse: 84   Resp: 16   Temp: 98.7 °F (37.1 °C)   SpO2: 96%       Documented weights    03/17/20 1554   Weight: 101 kg (222 lb)     Body mass index is 33.75 kg/m².    Results for orders placed or performed in visit on 02/21/20   Comprehensive Metabolic Panel   Result Value Ref Range    Glucose 105 (H) 65 - 99 mg/dL    BUN 19 8 - 23 mg/dL    Creatinine 0.89 0.57  - 1.00 mg/dL    eGFR Non African Am 62 >60 mL/min/1.73    eGFR African Am 75 >60 mL/min/1.73    BUN/Creatinine Ratio 21.3 7.0 - 25.0    Sodium 140 136 - 145 mmol/L    Potassium 4.2 3.5 - 5.2 mmol/L    Chloride 99 98 - 107 mmol/L    Total CO2 28.4 22.0 - 29.0 mmol/L    Calcium 9.5 8.6 - 10.5 mg/dL    Total Protein 6.5 6.0 - 8.5 g/dL    Albumin 4.10 3.50 - 5.20 g/dL    Globulin 2.4 gm/dL    A/G Ratio 1.7 g/dL    Total Bilirubin 0.4 0.2 - 1.2 mg/dL    Alkaline Phosphatase 60 39 - 117 U/L    AST (SGOT) 19 1 - 32 U/L    ALT (SGPT) 22 1 - 33 U/L   Lipid Panel With LDL / HDL Ratio   Result Value Ref Range    Total Cholesterol 193 0 - 200 mg/dL    Triglycerides 103 0 - 150 mg/dL    HDL Cholesterol 61 (H) 40 - 60 mg/dL    VLDL Cholesterol 20.6 mg/dL    LDL Cholesterol  111 (H) 0 - 100 mg/dL    LDL/HDL Ratio 1.83    CBC & Differential   Result Value Ref Range    WBC 5.48 3.40 - 10.80 10*3/mm3    RBC 4.49 3.77 - 5.28 10*6/mm3    Hemoglobin 13.4 12.0 - 15.9 g/dL    Hematocrit 40.3 34.0 - 46.6 %    MCV 89.8 79.0 - 97.0 fL    MCH 29.8 26.6 - 33.0 pg    MCHC 33.3 31.5 - 35.7 g/dL    RDW 12.2 (L) 12.3 - 15.4 %    Platelets 252 140 - 450 10*3/mm3    Neutrophil Rel % 47.8 42.7 - 76.0 %    Lymphocyte Rel % 40.0 19.6 - 45.3 %    Monocyte Rel % 8.2 5.0 - 12.0 %    Eosinophil Rel % 2.7 0.3 - 6.2 %    Basophil Rel % 0.9 0.0 - 1.5 %    Neutrophils Absolute 2.62 1.70 - 7.00 10*3/mm3    Lymphocytes Absolute 2.19 0.70 - 3.10 10*3/mm3    Monocytes Absolute 0.45 0.10 - 0.90 10*3/mm3    Eosinophils Absolute 0.15 0.00 - 0.40 10*3/mm3    Basophils Absolute 0.05 0.00 - 0.20 10*3/mm3    Immature Granulocyte Rel % 0.4 0.0 - 0.5 %    Immature Grans Absolute 0.02 0.00 - 0.05 10*3/mm3    nRBC 0.0 0.0 - 0.2 /100 WBC       Objective     Physical Exam   Constitutional: She is oriented to person, place, and time. She appears well-developed. No distress.   Cardiovascular: Normal rate and regular rhythm.   No murmur heard.  Pulmonary/Chest: Effort normal and  breath sounds normal. No respiratory distress.   Abdominal: Soft. Normal appearance and bowel sounds are normal. She exhibits no distension and no mass. There is no hepatosplenomegaly. There is no tenderness.   Musculoskeletal: Normal range of motion. She exhibits no edema.   Neurological: She is alert and oriented to person, place, and time.   Skin: Skin is warm and dry. No rash noted.   Psychiatric: She has a normal mood and affect.       Assessment/Plan     Assessment/Plan     Sanjana was seen today for abdominal pain.    Diagnoses and all orders for this visit:    Intermittent left lower quadrant abdominal pain      Summary:  Sanjana Morse presents office today due to intermittent problems with left lower quadrant abdominal pain.  However during office visit, she states pain had completely resolved.  Physical exam was normal in office today.  Reassured her with no nausea, vomiting, constipation, diarrhea, or fever, does not appear to be related to diverticulitis.  Also reassuring that the pain has resolved.  She has no other associated symptoms or dysuria.  Recommend continue to monitor for now.  Due to previous history of diverticulosis, instructed to increase fiber intake.  After discussion she does admit to increased consumption of corn a few days ago prior to onset of symptoms and wondering if possible cause.  Instructed to take MiraLAX daily.  Samples #2 given in office today.  Increase fluids.  She is to notify us if symptoms recur or worsen.    In the meantime, instructed to contact us sooner for any problems or concerns.    Laila Wheatley, APRN  Family Medicine  [facility] [place] [place]  03/17/20  15:58

## 2020-03-17 NOTE — TELEPHONE ENCOUNTER
PATIENT IS SCHEDULED TODAY AT 3:45 BUT IS NOT WANTING TO COME IN IF SHE DOES NOT HAVE TO.  SHE IS WANTING TO KNOW IF THIS CAN BE ADDRESSED OVER THE PHONE?  SHE THINKS SHE IS HAVING A DIVERTICULITIS FLARE UP. C/O DISCOMFORT FOR ABOUT A WEEK, LEFT SIDE OF ABDOMEN PAIN, CRAMPING SINCE YESTERDAY AND SOME NAUSEA.  WHAT SHOULD SHE DO?  SHE IS SCHEDULED FOR A COLONOSCOPY IN 2 WEEKS SO SHE ISN'T EVEN SURE IF SHE WILL STILL BE ABLE TO DO THAT WITH EVERYTHING GOING ON.

## 2020-03-18 NOTE — PATIENT INSTRUCTIONS
Diverticulosis    Diverticulosis is a condition that develops when small pouches (diverticula) form in the wall of the large intestine (colon). The colon is where water is absorbed and stool is formed. The pouches form when the inside layer of the colon pushes through weak spots in the outer layers of the colon. You may have a few pouches or many of them.  What are the causes?  The cause of this condition is not known.  What increases the risk?  The following factors may make you more likely to develop this condition:  · Being older than age 60. Your risk for this condition increases with age. Diverticulosis is rare among people younger than age 30. By age 80, many people have it.  · Eating a low-fiber diet.  · Having frequent constipation.  · Being overweight.  · Not getting enough exercise.  · Smoking.  · Taking over-the-counter pain medicines, like aspirin and ibuprofen.  · Having a family history of diverticulosis.  What are the signs or symptoms?  In most people, there are no symptoms of this condition. If you do have symptoms, they may include:  · Bloating.  · Cramps in the abdomen.  · Constipation or diarrhea.  · Pain in the lower left side of the abdomen.  How is this diagnosed?  This condition is most often diagnosed during an exam for other colon problems. Because diverticulosis usually has no symptoms, it often cannot be diagnosed independently. This condition may be diagnosed by:  · Using a flexible scope to examine the colon (colonoscopy).  · Taking an X-ray of the colon after dye has been put into the colon (barium enema).  · Doing a CT scan.  How is this treated?  You may not need treatment for this condition if you have never developed an infection related to diverticulosis. If you have had an infection before, treatment may include:  · Eating a high-fiber diet. This may include eating more fruits, vegetables, and grains.  · Taking a fiber supplement.  · Taking a live bacteria supplement  (probiotic).  · Taking medicine to relax your colon.  · Taking antibiotic medicines.  Follow these instructions at home:  · Drink 6-8 glasses of water or more each day to prevent constipation.  · Try not to strain when you have a bowel movement.  · If you have had an infection before:  ? Eat more fiber as directed by your health care provider or your diet and nutrition specialist (dietitian).  ? Take a fiber supplement or probiotic, if your health care provider approves.  · Take over-the-counter and prescription medicines only as told by your health care provider.  · If you were prescribed an antibiotic, take it as told by your health care provider. Do not stop taking the antibiotic even if you start to feel better.  · Keep all follow-up visits as told by your health care provider. This is important.  Contact a health care provider if:  · You have pain in your abdomen.  · You have bloating.  · You have cramps.  · You have not had a bowel movement in 3 days.  Get help right away if:  · Your pain gets worse.  · Your bloating becomes very bad.  · You have a fever or chills, and your symptoms suddenly get worse.  · You vomit.  · You have bowel movements that are bloody or black.  · You have bleeding from your rectum.  Summary  · Diverticulosis is a condition that develops when small pouches (diverticula) form in the wall of the large intestine (colon).  · You may have a few pouches or many of them.  · This condition is most often diagnosed during an exam for other colon problems.  · If you have had an infection related to diverticulosis, treatment may include increasing the fiber in your diet, taking supplements, or taking medicines.  This information is not intended to replace advice given to you by your health care provider. Make sure you discuss any questions you have with your health care provider.  Document Released: 09/14/2005 Document Revised: 11/06/2017 Document Reviewed: 11/06/2017  [employer] Patient  Education © 2020 [employer]

## 2020-04-13 ENCOUNTER — E-VISIT (OUTPATIENT)
Dept: FAMILY MEDICINE CLINIC | Facility: CLINIC | Age: 75
End: 2020-04-13

## 2020-04-13 DIAGNOSIS — N39.0 URINARY TRACT INFECTION WITHOUT HEMATURIA, SITE UNSPECIFIED: Primary | ICD-10-CM

## 2020-04-13 PROBLEM — Z12.11 COLON CANCER SCREENING: Status: ACTIVE | Noted: 2020-03-03

## 2020-04-13 PROBLEM — K21.9 GASTROESOPHAGEAL REFLUX DISEASE: Status: ACTIVE | Noted: 2020-03-03

## 2020-04-13 PROCEDURE — 99421 OL DIG E/M SVC 5-10 MIN: CPT | Performed by: PHYSICIAN ASSISTANT

## 2020-04-13 RX ORDER — PHENAZOPYRIDINE HYDROCHLORIDE 200 MG/1
200 TABLET, FILM COATED ORAL 3 TIMES DAILY PRN
Qty: 6 TABLET | Refills: 0 | Status: SHIPPED | OUTPATIENT
Start: 2020-04-13 | End: 2020-08-24

## 2020-04-13 RX ORDER — SULFAMETHOXAZOLE AND TRIMETHOPRIM 800; 160 MG/1; MG/1
1 TABLET ORAL 2 TIMES DAILY
Qty: 14 TABLET | Refills: 0 | Status: SHIPPED | OUTPATIENT
Start: 2020-04-13 | End: 2020-08-24

## 2020-04-13 NOTE — PROGRESS NOTES
Sanjana Morse is a 75 y.o. who presents today for an E-visit with complaints of dysuria and urine frequency for the past 2-7 days.  States the urine is normal in color which is yellow and clear.  She is able to urinate without difficulty except for the burning.  Denied any vaginal discharge or fevers.    I will send to pharmacy Bactrim DS antibiotic to take twice daily for 7 days.  We will also send Pyridium 200 mg to take 3 times a day as needed for dysuria and urine spasms.  Encouraged to increase water intake.  If symptoms do not improve.  Please notify office.    Time spent caring for the patient was 5 - 10 min.

## 2020-08-17 DIAGNOSIS — Z11.59 ENCOUNTER FOR HEPATITIS C SCREENING TEST FOR LOW RISK PATIENT: ICD-10-CM

## 2020-08-17 DIAGNOSIS — E78.00 HYPERCHOLESTEROLEMIA: ICD-10-CM

## 2020-08-17 DIAGNOSIS — Z00.00 MEDICARE ANNUAL WELLNESS VISIT, SUBSEQUENT: ICD-10-CM

## 2020-08-17 DIAGNOSIS — I10 ESSENTIAL HYPERTENSION: Primary | ICD-10-CM

## 2020-08-20 LAB
ALBUMIN SERPL-MCNC: 4.4 G/DL (ref 3.5–5.2)
ALBUMIN/GLOB SERPL: 2.2 G/DL
ALP SERPL-CCNC: 61 U/L (ref 39–117)
ALT SERPL-CCNC: 25 U/L (ref 1–33)
AST SERPL-CCNC: 23 U/L (ref 1–32)
BASOPHILS # BLD AUTO: 0.04 10*3/MM3 (ref 0–0.2)
BASOPHILS NFR BLD AUTO: 0.6 % (ref 0–1.5)
BILIRUB SERPL-MCNC: 0.5 MG/DL (ref 0–1.2)
BUN SERPL-MCNC: 18 MG/DL (ref 8–23)
BUN/CREAT SERPL: 24 (ref 7–25)
CALCIUM SERPL-MCNC: 9.8 MG/DL (ref 8.6–10.5)
CHLORIDE SERPL-SCNC: 100 MMOL/L (ref 98–107)
CHOLEST SERPL-MCNC: 215 MG/DL (ref 0–200)
CO2 SERPL-SCNC: 31 MMOL/L (ref 22–29)
CREAT SERPL-MCNC: 0.75 MG/DL (ref 0.57–1)
EOSINOPHIL # BLD AUTO: 0.16 10*3/MM3 (ref 0–0.4)
EOSINOPHIL NFR BLD AUTO: 2.5 % (ref 0.3–6.2)
ERYTHROCYTE [DISTWIDTH] IN BLOOD BY AUTOMATED COUNT: 11.7 % (ref 12.3–15.4)
GLOBULIN SER CALC-MCNC: 2 GM/DL
GLUCOSE SERPL-MCNC: 100 MG/DL (ref 65–99)
HCT VFR BLD AUTO: 40.4 % (ref 34–46.6)
HCV AB S/CO SERPL IA: <0.1 S/CO RATIO (ref 0–0.9)
HDLC SERPL-MCNC: 61 MG/DL (ref 40–60)
HGB BLD-MCNC: 13.7 G/DL (ref 12–15.9)
IMM GRANULOCYTES # BLD AUTO: 0.02 10*3/MM3 (ref 0–0.05)
IMM GRANULOCYTES NFR BLD AUTO: 0.3 % (ref 0–0.5)
LDLC SERPL CALC-MCNC: 127 MG/DL (ref 0–100)
LDLC/HDLC SERPL: 2.08 {RATIO}
LYMPHOCYTES # BLD AUTO: 2.68 10*3/MM3 (ref 0.7–3.1)
LYMPHOCYTES NFR BLD AUTO: 41.6 % (ref 19.6–45.3)
MCH RBC QN AUTO: 31.1 PG (ref 26.6–33)
MCHC RBC AUTO-ENTMCNC: 33.9 G/DL (ref 31.5–35.7)
MCV RBC AUTO: 91.6 FL (ref 79–97)
MONOCYTES # BLD AUTO: 0.62 10*3/MM3 (ref 0.1–0.9)
MONOCYTES NFR BLD AUTO: 9.6 % (ref 5–12)
NEUTROPHILS # BLD AUTO: 2.93 10*3/MM3 (ref 1.7–7)
NEUTROPHILS NFR BLD AUTO: 45.4 % (ref 42.7–76)
NRBC BLD AUTO-RTO: 0 /100 WBC (ref 0–0.2)
PLATELET # BLD AUTO: 252 10*3/MM3 (ref 140–450)
POTASSIUM SERPL-SCNC: 4.8 MMOL/L (ref 3.5–5.2)
PROT SERPL-MCNC: 6.4 G/DL (ref 6–8.5)
RBC # BLD AUTO: 4.41 10*6/MM3 (ref 3.77–5.28)
SODIUM SERPL-SCNC: 141 MMOL/L (ref 136–145)
TRIGL SERPL-MCNC: 135 MG/DL (ref 0–150)
VLDLC SERPL CALC-MCNC: 27 MG/DL
WBC # BLD AUTO: 6.45 10*3/MM3 (ref 3.4–10.8)

## 2020-08-24 ENCOUNTER — OFFICE VISIT (OUTPATIENT)
Dept: FAMILY MEDICINE CLINIC | Facility: CLINIC | Age: 75
End: 2020-08-24

## 2020-08-24 VITALS
HEART RATE: 75 BPM | BODY MASS INDEX: 33.19 KG/M2 | WEIGHT: 219 LBS | OXYGEN SATURATION: 98 % | DIASTOLIC BLOOD PRESSURE: 78 MMHG | SYSTOLIC BLOOD PRESSURE: 122 MMHG | TEMPERATURE: 98.8 F | HEIGHT: 68 IN

## 2020-08-24 DIAGNOSIS — E78.00 HYPERCHOLESTEROLEMIA: ICD-10-CM

## 2020-08-24 DIAGNOSIS — I10 ESSENTIAL HYPERTENSION: ICD-10-CM

## 2020-08-24 DIAGNOSIS — Z00.00 MEDICARE ANNUAL WELLNESS VISIT, SUBSEQUENT: Primary | ICD-10-CM

## 2020-08-24 PROCEDURE — G0439 PPPS, SUBSEQ VISIT: HCPCS | Performed by: PHYSICIAN ASSISTANT

## 2020-08-24 PROCEDURE — 96160 PT-FOCUSED HLTH RISK ASSMT: CPT | Performed by: PHYSICIAN ASSISTANT

## 2020-08-24 RX ORDER — LISINOPRIL 5 MG/1
5 TABLET ORAL DAILY
Qty: 90 TABLET | Refills: 3 | Status: SHIPPED | OUTPATIENT
Start: 2020-08-24 | End: 2021-08-23

## 2020-08-24 NOTE — PROGRESS NOTES
The ABCs of the Annual Wellness Visit  Subsequent Medicare Wellness Visit    Chief Complaint   Patient presents with   • Annual Exam       Subjective   History of Present Illness:  Sanjana Morse is a 75 y.o. female who presents for a Subsequent Medicare Wellness Visit.    HEALTH RISK ASSESSMENT    Recent Hospitalizations:  No hospitalization(s) within the last year.    Current Medical Providers:  Patient Care Team:  Reyna Singh PA-C as PCP - General  Daysi, Vicente Gonzalez MD as Consulting Physician (Pulmonary Disease)    Smoking Status:  Social History     Tobacco Use   Smoking Status Former Smoker   Smokeless Tobacco Never Used   Tobacco Comment    stop in 1988       Alcohol Consumption:  Social History     Substance and Sexual Activity   Alcohol Use Not on file       Depression Screen:   PHQ-2/PHQ-9 Depression Screening 8/24/2020   Little interest or pleasure in doing things 0   Feeling down, depressed, or hopeless 0   Trouble falling or staying asleep, or sleeping too much 0   Feeling tired or having little energy 0   Poor appetite or overeating 0   Feeling bad about yourself - or that you are a failure or have let yourself or your family down 0   Trouble concentrating on things, such as reading the newspaper or watching television 0   Moving or speaking so slowly that other people could have noticed. Or the opposite - being so fidgety or restless that you have been moving around a lot more than usual 0   Thoughts that you would be better off dead, or of hurting yourself in some way 0   Total Score 0   If you checked off any problems, how difficult have these problems made it for you to do your work, take care of things at home, or get along with other people? Not difficult at all       Fall Risk Screen:  STEADI Fall Risk Assessment was completed, and patient is at LOW risk for falls.Assessment completed on:8/24/2020    Health Habits and Functional and Cognitive Screening:  Functional & Cognitive  Status 8/24/2020   Do you have difficulty preparing food and eating? No   Do you have difficulty bathing yourself, getting dressed or grooming yourself? No   Do you have difficulty using the toilet? No   Do you have difficulty moving around from place to place? No   Do you have trouble with steps or getting out of a bed or a chair? No   Current Diet Well Balanced Diet   Dental Exam Up to date   Eye Exam Up to date   Exercise (times per week) 4 times per week   Current Exercise Activities Include Walking   Do you need help using the phone?  No   Are you deaf or do you have serious difficulty hearing?  No   Do you need help with transportation? No   Do you need help shopping? No   Do you need help preparing meals?  No   Do you need help with housework?  No   Do you need help with laundry? No   Do you need help taking your medications? No   Do you need help managing money? No   Do you ever drive or ride in a car without wearing a seat belt? No   Have you felt unusual stress, anger or loneliness in the last month? No   Who do you live with? Spouse   If you need help, do you have trouble finding someone available to you? No   Have you been bothered in the last four weeks by sexual problems? No   Do you have difficulty concentrating, remembering or making decisions? No         Does the patient have evidence of cognitive impairment? No    Asprin use counseling:Does not need ASA (and currently is not on it)    Age-appropriate Screening Schedule:  Refer to the list below for future screening recommendations based on patient's age, sex and/or medical conditions. Orders for these recommended tests are listed in the plan section. The patient has been provided with a written plan.    Health Maintenance   Topic Date Due   • ZOSTER VACCINE (3 of 3) 04/21/2020   • INFLUENZA VACCINE  08/01/2020   • COLONOSCOPY  10/01/2020 (Originally 9/15/2019)   • LIPID PANEL  08/19/2021   • DXA SCAN  09/03/2021   • MAMMOGRAM  02/20/2022   •  TDAP/TD VACCINES (2 - Td) 11/28/2026          The following portions of the patient's history were reviewed and updated as appropriate:   She  has no past medical history on file.  She does not have any pertinent problems on file.  She  has a past surgical history that includes Knee arthroscopy (Left, 03/05/2019).  Her family history is not on file.  She  reports that she has quit smoking. She has never used smokeless tobacco. Her alcohol and drug histories are not on file.  Current Outpatient Medications   Medication Sig Dispense Refill   • Calcium Carb-Cholecalciferol (CALCIUM 1000 + D PO) Take  by mouth.     • Cholecalciferol (VITAMIN D3 PO) Take  by mouth.     • lisinopril (PRINIVIL,ZESTRIL) 5 MG tablet Take 1 tablet by mouth Daily. 90 tablet 3   • Multiple Vitamins-Minerals (MULTIVITAMIN AND MINERALS) liquid liquid Take  by mouth Daily.     • Specialty Vitamins Products (MENOPAUSE SUPPORT PO) Take  by mouth.     • vitamin E 100 UNIT capsule Take 100 Units by mouth Daily.       No current facility-administered medications for this visit.      Current Outpatient Medications on File Prior to Visit   Medication Sig   • Calcium Carb-Cholecalciferol (CALCIUM 1000 + D PO) Take  by mouth.   • Cholecalciferol (VITAMIN D3 PO) Take  by mouth.   • Multiple Vitamins-Minerals (MULTIVITAMIN AND MINERALS) liquid liquid Take  by mouth Daily.   • Specialty Vitamins Products (MENOPAUSE SUPPORT PO) Take  by mouth.   • vitamin E 100 UNIT capsule Take 100 Units by mouth Daily.   • [DISCONTINUED] lisinopril (PRINIVIL,ZESTRIL) 5 MG tablet Take 1 tablet by mouth Daily.   • [DISCONTINUED] phenazopyridine (Pyridium) 200 MG tablet Take 1 tablet by mouth 3 (Three) Times a Day As Needed for Bladder Spasms.   • [DISCONTINUED] sulfamethoxazole-trimethoprim (BACTRIM DS,SEPTRA DS) 800-160 MG per tablet Take 1 tablet by mouth 2 (Two) Times a Day.     No current facility-administered medications on file prior to visit.      She is allergic to  hydrocodone..    Outpatient Medications Prior to Visit   Medication Sig Dispense Refill   • Calcium Carb-Cholecalciferol (CALCIUM 1000 + D PO) Take  by mouth.     • Cholecalciferol (VITAMIN D3 PO) Take  by mouth.     • Multiple Vitamins-Minerals (MULTIVITAMIN AND MINERALS) liquid liquid Take  by mouth Daily.     • Specialty Vitamins Products (MENOPAUSE SUPPORT PO) Take  by mouth.     • vitamin E 100 UNIT capsule Take 100 Units by mouth Daily.     • lisinopril (PRINIVIL,ZESTRIL) 5 MG tablet Take 1 tablet by mouth Daily. 90 tablet 3   • phenazopyridine (Pyridium) 200 MG tablet Take 1 tablet by mouth 3 (Three) Times a Day As Needed for Bladder Spasms. 6 tablet 0   • sulfamethoxazole-trimethoprim (BACTRIM DS,SEPTRA DS) 800-160 MG per tablet Take 1 tablet by mouth 2 (Two) Times a Day. 14 tablet 0     No facility-administered medications prior to visit.        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 Patient has an advance directive (not in EMR), copy requested.    Review of Systems   Constitutional: Negative.    HENT: Negative.    Eyes: Negative.    Respiratory: Negative.  Negative for cough, chest tightness, shortness of breath and wheezing.    Cardiovascular:  "Negative.  Negative for chest pain, palpitations and leg swelling.   Gastrointestinal: Negative.  Negative for abdominal pain, constipation, diarrhea, nausea and vomiting.   Endocrine: Negative.    Genitourinary: Negative.    Musculoskeletal: Negative.    Skin: Negative.    Allergic/Immunologic: Negative.    Neurological: Negative for dizziness, numbness and headaches.   Hematological: Negative.    Psychiatric/Behavioral: Negative.  Negative for sleep disturbance.   All other systems reviewed and are negative.      Compared to one year ago, the patient feels her physical health is better.  Compared to one year ago, the patient feels her mental health is better.    Reviewed chart for potential of high risk medication in the elderly: yes  Reviewed chart for potential of harmful drug interactions in the elderly:yes    Objective         Vitals:    08/24/20 0944   BP: 122/78   Pulse: 75   Temp: 98.8 °F (37.1 °C)   SpO2: 98%   Weight: 99.3 kg (219 lb)   Height: 172.7 cm (68\")       Body mass index is 33.3 kg/m².  Discussed the patient's BMI with her. The BMI is above average; BMI management plan is completed.    Physical Exam   Constitutional: She is oriented to person, place, and time. Vital signs are normal. She appears well-developed and well-nourished.   Sitting on exam chair wearing facial mask   HENT:   Head: Normocephalic and atraumatic.   Right Ear: Hearing, tympanic membrane, external ear and ear canal normal.   Left Ear: Hearing, tympanic membrane, external ear and ear canal normal.   Nose: Nose normal. Right sinus exhibits no maxillary sinus tenderness and no frontal sinus tenderness. Left sinus exhibits no maxillary sinus tenderness and no frontal sinus tenderness.   Mouth/Throat: Uvula is midline, oropharynx is clear and moist and mucous membranes are normal.   Eyes: Pupils are equal, round, and reactive to light. Conjunctivae, EOM and lids are normal.   Neck: Trachea normal and phonation normal. Neck supple. " Normal carotid pulses, no hepatojugular reflux and no JVD present. No tracheal tenderness present. Carotid bruit is not present. No tracheal deviation and no edema present. No thyroid mass and no thyromegaly present.   Cardiovascular: Normal rate, regular rhythm, S1 normal, S2 normal, normal heart sounds and normal pulses.   No murmur heard.  Pulmonary/Chest: Effort normal and breath sounds normal.   Abdominal: Soft. Normal appearance, normal aorta and bowel sounds are normal. There is no hepatomegaly. There is no tenderness.   Genitourinary:   Genitourinary Comments: Deferred due to patient's request   Lymphadenopathy:     She has no cervical adenopathy.   Neurological: She is alert and oriented to person, place, and time.   Skin: Skin is warm, dry and intact. Capillary refill takes less than 2 seconds.   Psychiatric: She has a normal mood and affect. Her speech is normal and behavior is normal. Judgment and thought content normal. Cognition and memory are normal.      I was wearing surgical mask during the entire office visit encounter.    Lab Results   Component Value Date     (H) 08/19/2020    CHLPL 215 (H) 08/19/2020    TRIG 135 08/19/2020    HDL 61 (H) 08/19/2020     (H) 08/19/2020    VLDL 27 08/19/2020        Assessment/Plan   Medicare Risks and Personalized Health Plan  CMS Preventative Services Quick Reference  Advance Directive Discussion  Obesity/Overweight     The above risks/problems have been discussed with the patient.  Pertinent information has been shared with the patient in the After Visit Summary.  Follow up plans and orders are seen below in the Assessment/Plan Section.    Diagnoses and all orders for this visit:    1. Medicare annual wellness visit, subsequent (Primary)    2. Essential hypertension  -     lisinopril (PRINIVIL,ZESTRIL) 5 MG tablet; Take 1 tablet by mouth Daily.  Dispense: 90 tablet; Refill: 3    3. Hypercholesterolemia    1.  Annual Medicare [employer] wellness exam  with hypertension: I have rechecked her blood pressure at office visit today got 132/78 in left arm.  I have refilled her lisinopril medication to pharmacy.  Plan to follow-up in 6 months.  We will get updated immunizations from her [facility] pharmacy.  She has upcoming appointment for colonoscopy next week.  2.  Chronic and stable hypercholesterolemia: I have reviewed her lab work that was collected on August 19, 2020 with her at office visit today.  Fasting blood sugar was 100, cholesterol 215, triglycerides 135, HDL 61 and LDL was 127.  Cholesterol readings have increased over the past 5 months.    Patient Counseling:  --Nutrition: Stressed importance of moderation in sodium/caffeine intake, saturated fat and cholesterol.  Discussed caloric balance, sufficient intake of fresh fruits, vegetables, fiber,   calcium, iron.  --Discussed the new recommendation against daily use of baby aspirin for primary prevention in low risk patients.  --Exercise: Stressed the importance of regular exercise by incorporating into daily routine.    --Substance Abuse: Discussed cessation/primary prevention of tobacco, alcohol, or other drug use; driving or other dangerous activities under the influence.    --Dental health: Discussed importance of regular tooth brushing, flossing, and dental visits.  -- Suggested having eyes and vision checked if needed or past due.  --Immunizations reviewed.  --Discussed benefits of screening colonoscopy.  Having colonoscopy with Dr. Grace on September 1, 2020        Orders Only on 08/17/2020   Component Date Value Ref Range Status   • WBC 08/19/2020 6.45  3.40 - 10.80 10*3/mm3 Final   • RBC 08/19/2020 4.41  3.77 - 5.28 10*6/mm3 Final   • Hemoglobin 08/19/2020 13.7  12.0 - 15.9 g/dL Final   • Hematocrit 08/19/2020 40.4  34.0 - 46.6 % Final   • MCV 08/19/2020 91.6  79.0 - 97.0 fL Final   • MCH 08/19/2020 31.1  26.6 - 33.0 pg Final   • MCHC 08/19/2020 33.9  31.5 - 35.7 g/dL Final   • RDW 08/19/2020 11.7*  12.3 - 15.4 % Final   • Platelets 08/19/2020 252  140 - 450 10*3/mm3 Final   • Neutrophil Rel % 08/19/2020 45.4  42.7 - 76.0 % Final   • Lymphocyte Rel % 08/19/2020 41.6  19.6 - 45.3 % Final   • Monocyte Rel % 08/19/2020 9.6  5.0 - 12.0 % Final   • Eosinophil Rel % 08/19/2020 2.5  0.3 - 6.2 % Final   • Basophil Rel % 08/19/2020 0.6  0.0 - 1.5 % Final   • Neutrophils Absolute 08/19/2020 2.93  1.70 - 7.00 10*3/mm3 Final   • Lymphocytes Absolute 08/19/2020 2.68  0.70 - 3.10 10*3/mm3 Final   • Monocytes Absolute 08/19/2020 0.62  0.10 - 0.90 10*3/mm3 Final   • Eosinophils Absolute 08/19/2020 0.16  0.00 - 0.40 10*3/mm3 Final   • Basophils Absolute 08/19/2020 0.04  0.00 - 0.20 10*3/mm3 Final   • Immature Granulocyte Rel % 08/19/2020 0.3  0.0 - 0.5 % Final   • Immature Grans Absolute 08/19/2020 0.02  0.00 - 0.05 10*3/mm3 Final   • nRBC 08/19/2020 0.0  0.0 - 0.2 /100 WBC Final   • Glucose 08/19/2020 100* 65 - 99 mg/dL Final   • BUN 08/19/2020 18  8 - 23 mg/dL Final   • Creatinine 08/19/2020 0.75  0.57 - 1.00 mg/dL Final   • eGFR Non African Am 08/19/2020 75  >60 mL/min/1.73 Final    Comment: The MDRD GFR formula is only valid for adults with stable  renal function between ages 18 and 70.     • eGFR  Am 08/19/2020 91  >60 mL/min/1.73 Final   • BUN/Creatinine Ratio 08/19/2020 24.0  7.0 - 25.0 Final   • Sodium 08/19/2020 141  136 - 145 mmol/L Final   • Potassium 08/19/2020 4.8  3.5 - 5.2 mmol/L Final   • Chloride 08/19/2020 100  98 - 107 mmol/L Final   • Total CO2 08/19/2020 31.0* 22.0 - 29.0 mmol/L Final   • Calcium 08/19/2020 9.8  8.6 - 10.5 mg/dL Final   • Total Protein 08/19/2020 6.4  6.0 - 8.5 g/dL Final   • Albumin 08/19/2020 4.40  3.50 - 5.20 g/dL Final   • Globulin 08/19/2020 2.0  gm/dL Final   • A/G Ratio 08/19/2020 2.2  g/dL Final   • Total Bilirubin 08/19/2020 0.5  0.0 - 1.2 mg/dL Final   • Alkaline Phosphatase 08/19/2020 61  39 - 117 U/L Final   • AST (SGOT) 08/19/2020 23  1 - 32 U/L Final   • ALT (SGPT)  08/19/2020 25  1 - 33 U/L Final   • Hep C Virus Ab 08/19/2020 <0.1  0.0 - 0.9 s/co ratio Final    Comment:                                   Negative:     < 0.8                               Indeterminate: 0.8 - 0.9                                    Positive:     > 0.9   The CDC recommends that a positive HCV antibody result   be followed up with a HCV Nucleic Acid Amplification   test (072332).     • Total Cholesterol 08/19/2020 215* 0 - 200 mg/dL Final   • Triglycerides 08/19/2020 135  0 - 150 mg/dL Final   • HDL Cholesterol 08/19/2020 61* 40 - 60 mg/dL Final   • VLDL Cholesterol 08/19/2020 27  mg/dL Final   • LDL Cholesterol  08/19/2020 127* 0 - 100 mg/dL Final   • LDL/HDL Ratio 08/19/2020 2.08   Final       Follow Up:  No follow-ups on file.     An After Visit Summary and PPPS were given to the patient.

## 2020-09-25 ENCOUNTER — TELEMEDICINE (OUTPATIENT)
Dept: FAMILY MEDICINE CLINIC | Facility: CLINIC | Age: 75
End: 2020-09-25

## 2020-09-25 DIAGNOSIS — J30.1 SEASONAL ALLERGIC RHINITIS DUE TO POLLEN: Primary | ICD-10-CM

## 2020-09-25 PROCEDURE — 99213 OFFICE O/P EST LOW 20 MIN: CPT | Performed by: FAMILY MEDICINE

## 2020-09-25 RX ORDER — PREDNISONE 10 MG/1
10 TABLET ORAL 3 TIMES DAILY
Qty: 21 TABLET | Refills: 0 | Status: SHIPPED | OUTPATIENT
Start: 2020-09-25 | End: 2020-10-09

## 2020-09-25 NOTE — PROGRESS NOTES
Subjective   Sanjana Morse is a 75 y.o. female who is here for   Chief Complaint   Patient presents with   • Allergies   .     History of Present Illness   Unable to complete visit using a video connection to the patient. A phone visit was used to complete this visits. Total time of discussion was 8 minutes.    Today is telephone medical visit between Dr Scott Vasquez and current patient.  Consent is given by patient for his encounter.  This is occurring during the current COVID 19 pandemic with social isolation mandates per federal and state guidelines. Patient's chart has been reviewed.    Allergies are in flair  On symbicort, claritin and flonase      The following portions of the patient's history were reviewed and updated as appropriate: allergies, current medications, past family history, past medical history, past social history, past surgical history and problem list.    Review of Systems   HENT: Positive for congestion, postnasal drip, rhinorrhea and sinus pressure.        Objective   Physical Exam    Assessment/Plan   Sanjana was seen today for allergies.    Diagnoses and all orders for this visit:    Seasonal allergic rhinitis due to pollen  -     predniSONE (DELTASONE) 10 MG tablet; Take 1 tablet by mouth 3 (Three) Times a Day.      There are no Patient Instructions on file for this visit.    There are no discontinued medications.     No follow-ups on file.    Dr. Scott Vasquez  [facility] [place], [place].

## 2020-10-07 ENCOUNTER — TELEPHONE (OUTPATIENT)
Dept: FAMILY MEDICINE CLINIC | Facility: CLINIC | Age: 75
End: 2020-10-07

## 2020-10-07 NOTE — TELEPHONE ENCOUNTER
Patient called in and stated she had a pedicure and had a acrylic nail put on due to her toenail splitting and patient stated now its sore and red around the quick area and wanted to know if she should have it taken off or if something be called in .           Please call and advise at [phone]

## 2020-10-08 ENCOUNTER — OFFICE VISIT (OUTPATIENT)
Dept: FAMILY MEDICINE CLINIC | Facility: CLINIC | Age: 75
End: 2020-10-08

## 2020-10-08 VITALS
SYSTOLIC BLOOD PRESSURE: 124 MMHG | RESPIRATION RATE: 16 BRPM | TEMPERATURE: 97.6 F | HEART RATE: 69 BPM | HEIGHT: 68 IN | WEIGHT: 220 LBS | OXYGEN SATURATION: 95 % | DIASTOLIC BLOOD PRESSURE: 78 MMHG | BODY MASS INDEX: 33.34 KG/M2

## 2020-10-08 DIAGNOSIS — L03.032 PARONYCHIA OF GREAT TOE OF LEFT FOOT: Primary | ICD-10-CM

## 2020-10-08 PROCEDURE — 99213 OFFICE O/P EST LOW 20 MIN: CPT | Performed by: PHYSICIAN ASSISTANT

## 2020-10-08 RX ORDER — CEPHALEXIN 500 MG/1
500 CAPSULE ORAL 3 TIMES DAILY
Qty: 21 CAPSULE | Refills: 0 | Status: SHIPPED | OUTPATIENT
Start: 2020-10-08 | End: 2021-02-24

## 2020-10-08 NOTE — PROGRESS NOTES
"Subjective   Sanjana Morse is a 75 y.o. female presents for   Chief Complaint   Patient presents with   • Toe Pain     x7days       History of Present Illness     Sanjana is a 75-year-old female who presents with new left great toenail pain.  Sanjana states her toenails have been cracking.  She decided to put an acrylic nail on her left great toe nail 3 weeks ago.  She has been wearing shoes and boots when she is outside walking through the fields.  She noticed that she had redness at the base of her left great toe.  She also noticed some bruising around the nail as well.  Does not recall any injury except wearing some tight shoes while walking.  She has not gone back to the nail salon where she had the nail put on.  Denied any fevers, chills or drainage from the area.  She was prescribed topical Bactroban which she has been applying the last 2 days.  She has not seen any improvement.    The following portions of the patient's history were reviewed and updated as appropriate: allergies, current medications, past family history, past medical history, past social history, past surgical history and problem list.    Review of Systems   Constitutional: Negative.  Negative for chills, fatigue and fever.   HENT: Negative.    Eyes: Negative.    Respiratory: Negative.    Cardiovascular: Negative.    Gastrointestinal: Negative.    Endocrine: Negative.    Genitourinary: Negative.    Musculoskeletal: Negative.    Skin: Positive for color change. Negative for rash.        Left great toenail/toe   Allergic/Immunologic: Negative.    Neurological: Negative.    Hematological: Negative.    Psychiatric/Behavioral: Negative.          Vitals:    10/08/20 1140   BP: 124/78   Pulse: 69   Resp: 16   Temp: 97.6 °F (36.4 °C)   SpO2: 95%   Weight: 99.8 kg (220 lb)   Height: 172.7 cm (68\")     Wt Readings from Last 3 Encounters:   10/08/20 99.8 kg (220 lb)   08/24/20 99.3 kg (219 lb)   03/17/20 101 kg (222 lb)     BP Readings from Last 3 " Encounters:   10/08/20 124/78   08/24/20 122/78   03/17/20 130/70     Social History     Socioeconomic History   • Marital status:      Spouse name: Not on file   • Number of children: Not on file   • Years of education: Not on file   • Highest education level: Not on file   Tobacco Use   • Smoking status: Former Smoker   • Smokeless tobacco: Never Used   • Tobacco comment: stop in 1988       Allergies   Allergen Reactions   • Hydrocodone Other (See Comments)     hallucinations       Body mass index is 33.45 kg/m².    Objective   Physical Exam  Constitutional:       Appearance: Normal appearance. She is well-developed and well-groomed. She is obese.      Interventions: Face mask in place.   Musculoskeletal:        Feet:    Feet:      Left foot:      Skin integrity: Blister and erythema present.   Skin:     General: Skin is warm and dry.      Capillary Refill: Capillary refill takes less than 2 seconds.   Neurological:      Mental Status: She is alert and oriented to person, place, and time.   Psychiatric:         Attention and Perception: Attention and perception normal.         Mood and Affect: Mood and affect normal.         Speech: Speech normal.         Behavior: Behavior normal. Behavior is cooperative.         Thought Content: Thought content normal.         Cognition and Memory: Cognition and memory normal.         Judgment: Judgment normal.      I was wearing surgical mask during the entire office visit encounter.      Assessment/Plan   Sanjana was seen today for toe pain.    Diagnoses and all orders for this visit:    Paronychia of great toe of left foot  -     cephalexin (Keflex) 500 MG capsule; Take 1 capsule by mouth 3 (Three) Times a Day.      Mrs. Sanjana Barrios was seen and diagnosed with acute Paronychia of left great toe.  Will send Keflex antibiotic to take 3 times a day to pharmacy.  Advised her to try to remove the acrylic nail as well.  I suspected she had a traumatic injury to nail while  wearing shoes which is causing increased bruising possible bleeding under the nail.  Sanjana will update status in a few days.  She was instructed if symptoms worsen, she is to go to the ER for further evaluation and treatment.    BRENDA Garcia [facility]  [address] [place] [id number]  Dept: [phone]  Dept Fax: [phone]  Loc: [phone]  Loc Fax: [phone]

## 2020-10-19 ENCOUNTER — FLU SHOT (OUTPATIENT)
Dept: FAMILY MEDICINE CLINIC | Facility: CLINIC | Age: 75
End: 2020-10-19

## 2020-10-19 DIAGNOSIS — Z23 NEED FOR INFLUENZA VACCINATION: ICD-10-CM

## 2020-10-19 PROCEDURE — G0008 ADMIN INFLUENZA VIRUS VAC: HCPCS | Performed by: PHYSICIAN ASSISTANT

## 2020-10-19 PROCEDURE — 90694 VACC AIIV4 NO PRSRV 0.5ML IM: CPT | Performed by: PHYSICIAN ASSISTANT

## 2021-01-20 ENCOUNTER — TELEPHONE (OUTPATIENT)
Dept: FAMILY MEDICINE CLINIC | Facility: CLINIC | Age: 76
End: 2021-01-20

## 2021-01-20 NOTE — TELEPHONE ENCOUNTER
Caller: Sanjana Morse    Relationship to patient: Self    Best call back number: [phone]    Concerns or Questions if Applicable: PATIENT CALLED STATING HER SON TESTED POSITIVE FOR COVID ON 1/19/21. HER SON ACTUALLY FELT SICK SINCE LAST Thursday 1/14/21. PATIENT WOULD LIKE TO KNOW IF SHE AND HER  SHOULD BE TESTED.    Travel screen questions: PATIENT AND  HAVE NO SYMPTOMS BUT HAVE BEEN EXPOSED.      PLEASE ADVISE AND CALL PATIENT [phone].

## 2021-01-20 NOTE — TELEPHONE ENCOUNTER
Notify patient that according to CDC guidelines she would need to quarantine for the next 14 days at home if she has had a known exposure to COVID-19.  Would recommend testing in 10 days or sooner if she starts having symptoms.

## 2021-01-28 ENCOUNTER — TELEPHONE (OUTPATIENT)
Dept: FAMILY MEDICINE CLINIC | Facility: CLINIC | Age: 76
End: 2021-01-28

## 2021-01-28 NOTE — TELEPHONE ENCOUNTER
Caller: Sanjana Morse    Relationship to patient: Self    Best call back number: [phone]     Concerns or Questions if Applicable:Patient is wanting to know if she can get an Covid antibody test.  She states that she was really sick about a year and half ago and wants to get this test done before she gets the Covid vaccine on 02/11/21.    Please advise.

## 2021-01-28 NOTE — TELEPHONE ENCOUNTER
Returned call to pt and informed her that she can call any [facility] and ask to have the antibodies test drawn

## 2021-02-19 ENCOUNTER — TELEPHONE (OUTPATIENT)
Dept: FAMILY MEDICINE CLINIC | Facility: CLINIC | Age: 76
End: 2021-02-19

## 2021-02-19 DIAGNOSIS — Z12.31 SCREENING MAMMOGRAM, ENCOUNTER FOR: Primary | ICD-10-CM

## 2021-02-19 NOTE — TELEPHONE ENCOUNTER
Caller: Sanjana Morse    Relationship: Self    Best call back number: [phone]    What orders are you requesting (i.e. lab or imaging): ROUTINE MAMMOGRAM    In what timeframe would the patient need to come in: PT WANTS TO SCHEDULE TODAY    Where will you receive your lab/imaging services: [facility]    Additional notes: PLEASE SEND TODAY IF AT ALL POSSIBLE, PLEASE NOTIFY PT ONCE IT HAS BEEN SENT

## 2021-02-24 ENCOUNTER — OFFICE VISIT (OUTPATIENT)
Dept: FAMILY MEDICINE CLINIC | Facility: CLINIC | Age: 76
End: 2021-02-24

## 2021-02-24 VITALS
OXYGEN SATURATION: 98 % | HEART RATE: 69 BPM | HEIGHT: 68 IN | SYSTOLIC BLOOD PRESSURE: 138 MMHG | DIASTOLIC BLOOD PRESSURE: 80 MMHG | TEMPERATURE: 97.3 F | WEIGHT: 222 LBS | BODY MASS INDEX: 33.65 KG/M2

## 2021-02-24 DIAGNOSIS — E78.00 HYPERCHOLESTEROLEMIA: ICD-10-CM

## 2021-02-24 DIAGNOSIS — I10 ESSENTIAL HYPERTENSION: Primary | ICD-10-CM

## 2021-02-24 DIAGNOSIS — R00.2 HEART PALPITATIONS: ICD-10-CM

## 2021-02-24 PROCEDURE — 93000 ELECTROCARDIOGRAM COMPLETE: CPT | Performed by: PHYSICIAN ASSISTANT

## 2021-02-24 PROCEDURE — 99214 OFFICE O/P EST MOD 30 MIN: CPT | Performed by: PHYSICIAN ASSISTANT

## 2021-02-24 RX ORDER — KETOROLAC TROMETHAMINE 5 MG/ML
SOLUTION OPHTHALMIC
COMMUNITY
Start: 2021-02-10 | End: 2021-02-24

## 2021-02-24 NOTE — PROGRESS NOTES
"Chief Complaint  Hypertension and Hyperlipidemia    Subjective          Sanjana Morse presents to [facility]  History of Present Illness  Sanjana is a 75-year-old female who presents for hypertension and hyperlipidemia management.  States she took her blood pressure medicine approximately 1 hour ago.  Has been taking blood pressure at home and has been getting normal results.  She has had a few days of episodes of heart palpitations.  States it was happening the same time for at least 3 days overall.  The heart palpitations would last for an hour then totally resolved.  Denied any increased intake of caffeine.  Denied any chest pain, shortness of air, dizziness or swelling of ankles.  Has been a little worried about her daughter battling ulcerative colitis.  States she thinks she is handling the situation fine.  Diet and sleep have been normal.  She has been trying to walk for exercising.     Objective   Vital Signs:   /80 (BP Location: Left arm, Patient Position: Sitting, Cuff Size: Adult)   Pulse 69   Temp 97.3 °F (36.3 °C)   Ht 172.7 cm (68\")   Wt 101 kg (222 lb)   SpO2 98%   BMI 33.75 kg/m²     Physical Exam  Vitals signs and nursing note reviewed.   Constitutional:       Appearance: Normal appearance. She is well-developed and well-groomed. She is obese.      Interventions: Face mask in place.   HENT:      Head: Normocephalic and atraumatic.   Neck:      Musculoskeletal: No edema.      Thyroid: No thyroid mass, thyromegaly or thyroid tenderness.      Vascular: Normal carotid pulses. No carotid bruit, hepatojugular reflux or JVD.      Trachea: Trachea and phonation normal. No tracheal tenderness.   Cardiovascular:      Rate and Rhythm: Normal rate and regular rhythm.      Heart sounds: Normal heart sounds, S1 normal and S2 normal. No murmur.   Pulmonary:      Effort: Pulmonary effort is normal.      Breath sounds: Normal breath sounds and air entry.   Abdominal:      " General: Bowel sounds are normal.      Palpations: Abdomen is soft. There is no hepatomegaly.      Tenderness: There is no abdominal tenderness.   Musculoskeletal:      Right lower leg: No edema.      Left lower leg: No edema.   Skin:     General: Skin is warm and dry.      Capillary Refill: Capillary refill takes less than 2 seconds.   Neurological:      Mental Status: She is alert and oriented to person, place, and time.   Psychiatric:         Attention and Perception: Attention and perception normal.         Mood and Affect: Mood and affect normal.         Speech: Speech normal.         Behavior: Behavior normal. Behavior is cooperative.         Thought Content: Thought content normal.         Cognition and Memory: Cognition and memory normal.         Judgment: Judgment normal.     I was wearing a surgical mask and face shield during the entire office visit/encounter.     Result Review :          ECG 12 Lead    Date/Time: 2/24/2021 12:13 PM  Performed by: Reyna Singh PA-C  Authorized by: Reyna Singh PA-C   Comparison: compared with previous ECG from 11/28/2016  Similar to previous ECG  Rhythm: sinus bradycardia  Rate: normal  BPM: 59  Conduction: conduction normal  ST Segments: ST segments normal  T Waves: T waves normal  QRS axis: normal    Clinical impression: non-specific ECG  Comments: Indication hypertension with occasional heart palpitations  ME int:  173 ms  QRS dur:  84 ms  QT/QTc:  376/376 ms                Assessment and Plan    Diagnoses and all orders for this visit:    1. Essential hypertension (Primary)    2. Hypercholesterolemia  -     CBC & Differential; Future  -     Comprehensive Metabolic Panel; Future  -     Lipid Panel With LDL / HDL Ratio; Future  -     ECG 12 Lead    3. Heart palpitations  -     TSH; Future  -     ECG 12 Lead    1.  Chronic and stable hypertension: I have rechecked blood pressure at office visit today 38/80 in left arm.  She will continue current lisinopril  medication at home as directed.  2.  Chronic and stable hypercholesterolemia: She will return to office in the next few weeks for CBC, CMP and lipid profile.  Sanjana will be notified of test results when completed.  3.  New heart palpitation: In office EKG showed sinus bradycardia.  I will check TSH with above blood work.  I suspect this may be related to anxiety issues.  She does not want medication at this time.  She will be notified of test results and any medication changes.  Sanjana will return to office if symptoms recur.        Follow Up   Return in about 6 months (around 8/24/2021), or labs in the next week, for Medicare Wellness.  Patient was given instructions and counseling regarding her condition or for health maintenance advice. Please see specific information pulled into the AVS if appropriate.       BRENDA Garcia [facility]  [address] [place] [id number]  Dept: [phone]  Dept Fax: [phone]  Loc: [phone]  Loc Fax: [phone]      Answers for HPI/ROS submitted by the patient on 2/17/2021   What is the primary reason for your visit?: High Blood Pressure

## 2021-02-26 LAB
ALBUMIN SERPL-MCNC: 4.2 G/DL (ref 3.5–5.2)
ALBUMIN/GLOB SERPL: 2 G/DL
ALP SERPL-CCNC: 64 U/L (ref 39–117)
ALT SERPL-CCNC: 24 U/L (ref 1–33)
AST SERPL-CCNC: 26 U/L (ref 1–32)
BASOPHILS # BLD AUTO: 0.04 10*3/MM3 (ref 0–0.2)
BASOPHILS NFR BLD AUTO: 0.7 % (ref 0–1.5)
BILIRUB SERPL-MCNC: 0.5 MG/DL (ref 0–1.2)
BUN SERPL-MCNC: 17 MG/DL (ref 8–23)
BUN/CREAT SERPL: 24.6 (ref 7–25)
CALCIUM SERPL-MCNC: 9.7 MG/DL (ref 8.6–10.5)
CHLORIDE SERPL-SCNC: 100 MMOL/L (ref 98–107)
CHOLEST SERPL-MCNC: 199 MG/DL (ref 0–200)
CO2 SERPL-SCNC: 29.2 MMOL/L (ref 22–29)
CREAT SERPL-MCNC: 0.69 MG/DL (ref 0.57–1)
EOSINOPHIL # BLD AUTO: 0.13 10*3/MM3 (ref 0–0.4)
EOSINOPHIL NFR BLD AUTO: 2.3 % (ref 0.3–6.2)
ERYTHROCYTE [DISTWIDTH] IN BLOOD BY AUTOMATED COUNT: 11.7 % (ref 12.3–15.4)
GLOBULIN SER CALC-MCNC: 2.1 GM/DL
GLUCOSE SERPL-MCNC: 100 MG/DL (ref 65–99)
HCT VFR BLD AUTO: 41.9 % (ref 34–46.6)
HDLC SERPL-MCNC: 59 MG/DL (ref 40–60)
HGB BLD-MCNC: 14.2 G/DL (ref 12–15.9)
IMM GRANULOCYTES # BLD AUTO: 0.02 10*3/MM3 (ref 0–0.05)
IMM GRANULOCYTES NFR BLD AUTO: 0.4 % (ref 0–0.5)
LDLC SERPL CALC-MCNC: 116 MG/DL (ref 0–100)
LDLC/HDLC SERPL: 1.92 {RATIO}
LYMPHOCYTES # BLD AUTO: 1.99 10*3/MM3 (ref 0.7–3.1)
LYMPHOCYTES NFR BLD AUTO: 35.4 % (ref 19.6–45.3)
MCH RBC QN AUTO: 30.9 PG (ref 26.6–33)
MCHC RBC AUTO-ENTMCNC: 33.9 G/DL (ref 31.5–35.7)
MCV RBC AUTO: 91.1 FL (ref 79–97)
MONOCYTES # BLD AUTO: 0.53 10*3/MM3 (ref 0.1–0.9)
MONOCYTES NFR BLD AUTO: 9.4 % (ref 5–12)
NEUTROPHILS # BLD AUTO: 2.91 10*3/MM3 (ref 1.7–7)
NEUTROPHILS NFR BLD AUTO: 51.8 % (ref 42.7–76)
NRBC BLD AUTO-RTO: 0 /100 WBC (ref 0–0.2)
PLATELET # BLD AUTO: 243 10*3/MM3 (ref 140–450)
POTASSIUM SERPL-SCNC: 4.4 MMOL/L (ref 3.5–5.2)
PROT SERPL-MCNC: 6.3 G/DL (ref 6–8.5)
RBC # BLD AUTO: 4.6 10*6/MM3 (ref 3.77–5.28)
SODIUM SERPL-SCNC: 138 MMOL/L (ref 136–145)
TRIGL SERPL-MCNC: 134 MG/DL (ref 0–150)
TSH SERPL DL<=0.005 MIU/L-ACNC: 3.7 UIU/ML (ref 0.27–4.2)
VLDLC SERPL CALC-MCNC: 24 MG/DL (ref 5–40)
WBC # BLD AUTO: 5.62 10*3/MM3 (ref 3.4–10.8)

## 2021-07-09 DIAGNOSIS — Z87.891 SMOKING HISTORY: ICD-10-CM

## 2021-07-09 DIAGNOSIS — IMO0001 LUNG NODULE < 6CM ON CT: Primary | ICD-10-CM

## 2021-07-19 DIAGNOSIS — IMO0001 LUNG NODULE < 6CM ON CT: ICD-10-CM

## 2021-07-19 DIAGNOSIS — Z87.891 SMOKING HISTORY: ICD-10-CM

## 2021-08-16 DIAGNOSIS — I10 ESSENTIAL HYPERTENSION: Primary | ICD-10-CM

## 2021-08-16 DIAGNOSIS — Z00.00 MEDICARE ANNUAL WELLNESS VISIT, SUBSEQUENT: ICD-10-CM

## 2021-08-16 DIAGNOSIS — E78.00 HYPERCHOLESTEROLEMIA: ICD-10-CM

## 2021-08-21 LAB
ALBUMIN SERPL-MCNC: 4.3 G/DL (ref 3.5–5.2)
ALBUMIN/GLOB SERPL: 1.8 G/DL
ALP SERPL-CCNC: 69 U/L (ref 39–117)
ALT SERPL-CCNC: 24 U/L (ref 1–33)
AST SERPL-CCNC: 22 U/L (ref 1–32)
BASOPHILS # BLD AUTO: 0.03 10*3/MM3 (ref 0–0.2)
BASOPHILS NFR BLD AUTO: 0.5 % (ref 0–1.5)
BILIRUB SERPL-MCNC: 0.5 MG/DL (ref 0–1.2)
BUN SERPL-MCNC: 21 MG/DL (ref 8–23)
BUN/CREAT SERPL: 28.4 (ref 7–25)
CALCIUM SERPL-MCNC: 10 MG/DL (ref 8.6–10.5)
CHLORIDE SERPL-SCNC: 101 MMOL/L (ref 98–107)
CHOLEST SERPL-MCNC: 207 MG/DL (ref 0–200)
CO2 SERPL-SCNC: 28.8 MMOL/L (ref 22–29)
CREAT SERPL-MCNC: 0.74 MG/DL (ref 0.57–1)
EOSINOPHIL # BLD AUTO: 0.12 10*3/MM3 (ref 0–0.4)
EOSINOPHIL NFR BLD AUTO: 2 % (ref 0.3–6.2)
ERYTHROCYTE [DISTWIDTH] IN BLOOD BY AUTOMATED COUNT: 11.8 % (ref 12.3–15.4)
GLOBULIN SER CALC-MCNC: 2.4 GM/DL
GLUCOSE SERPL-MCNC: 99 MG/DL (ref 65–99)
HCT VFR BLD AUTO: 41.4 % (ref 34–46.6)
HDLC SERPL-MCNC: 54 MG/DL (ref 40–60)
HGB BLD-MCNC: 13.8 G/DL (ref 12–15.9)
IMM GRANULOCYTES # BLD AUTO: 0.01 10*3/MM3 (ref 0–0.05)
IMM GRANULOCYTES NFR BLD AUTO: 0.2 % (ref 0–0.5)
LDLC SERPL CALC-MCNC: 126 MG/DL (ref 0–100)
LDLC/HDLC SERPL: 2.27 {RATIO}
LYMPHOCYTES # BLD AUTO: 2.3 10*3/MM3 (ref 0.7–3.1)
LYMPHOCYTES NFR BLD AUTO: 37.5 % (ref 19.6–45.3)
MCH RBC QN AUTO: 30.8 PG (ref 26.6–33)
MCHC RBC AUTO-ENTMCNC: 33.3 G/DL (ref 31.5–35.7)
MCV RBC AUTO: 92.4 FL (ref 79–97)
MONOCYTES # BLD AUTO: 0.57 10*3/MM3 (ref 0.1–0.9)
MONOCYTES NFR BLD AUTO: 9.3 % (ref 5–12)
NEUTROPHILS # BLD AUTO: 3.1 10*3/MM3 (ref 1.7–7)
NEUTROPHILS NFR BLD AUTO: 50.5 % (ref 42.7–76)
NRBC BLD AUTO-RTO: 0 /100 WBC (ref 0–0.2)
PLATELET # BLD AUTO: 241 10*3/MM3 (ref 140–450)
POTASSIUM SERPL-SCNC: 4.5 MMOL/L (ref 3.5–5.2)
PROT SERPL-MCNC: 6.7 G/DL (ref 6–8.5)
RBC # BLD AUTO: 4.48 10*6/MM3 (ref 3.77–5.28)
SODIUM SERPL-SCNC: 138 MMOL/L (ref 136–145)
TRIGL SERPL-MCNC: 153 MG/DL (ref 0–150)
VLDLC SERPL CALC-MCNC: 27 MG/DL (ref 5–40)
WBC # BLD AUTO: 6.13 10*3/MM3 (ref 3.4–10.8)

## 2021-08-23 DIAGNOSIS — I10 ESSENTIAL HYPERTENSION: ICD-10-CM

## 2021-08-23 RX ORDER — LISINOPRIL 5 MG/1
TABLET ORAL
Qty: 90 TABLET | Refills: 0 | Status: SHIPPED | OUTPATIENT
Start: 2021-08-23 | End: 2021-11-08

## 2021-09-17 ENCOUNTER — OFFICE VISIT (OUTPATIENT)
Dept: FAMILY MEDICINE CLINIC | Facility: CLINIC | Age: 76
End: 2021-09-17

## 2021-09-17 VITALS
RESPIRATION RATE: 13 BRPM | TEMPERATURE: 97.1 F | OXYGEN SATURATION: 97 % | HEART RATE: 71 BPM | WEIGHT: 220 LBS | SYSTOLIC BLOOD PRESSURE: 120 MMHG | DIASTOLIC BLOOD PRESSURE: 68 MMHG | BODY MASS INDEX: 33.34 KG/M2 | HEIGHT: 68 IN

## 2021-09-17 DIAGNOSIS — Z78.0 POST-MENOPAUSAL: ICD-10-CM

## 2021-09-17 DIAGNOSIS — R42 DIZZINESS: ICD-10-CM

## 2021-09-17 DIAGNOSIS — Z00.00 MEDICARE ANNUAL WELLNESS VISIT, SUBSEQUENT: Primary | ICD-10-CM

## 2021-09-17 DIAGNOSIS — E78.00 HYPERCHOLESTEROLEMIA: ICD-10-CM

## 2021-09-17 DIAGNOSIS — M85.80 OSTEOPENIA, UNSPECIFIED LOCATION: ICD-10-CM

## 2021-09-17 DIAGNOSIS — I10 ESSENTIAL HYPERTENSION: ICD-10-CM

## 2021-09-17 PROCEDURE — 1126F AMNT PAIN NOTED NONE PRSNT: CPT | Performed by: PHYSICIAN ASSISTANT

## 2021-09-17 PROCEDURE — 1160F RVW MEDS BY RX/DR IN RCRD: CPT | Performed by: PHYSICIAN ASSISTANT

## 2021-09-17 PROCEDURE — G0439 PPPS, SUBSEQ VISIT: HCPCS | Performed by: PHYSICIAN ASSISTANT

## 2021-09-17 PROCEDURE — 1170F FXNL STATUS ASSESSED: CPT | Performed by: PHYSICIAN ASSISTANT

## 2021-09-17 PROCEDURE — 93000 ELECTROCARDIOGRAM COMPLETE: CPT | Performed by: PHYSICIAN ASSISTANT

## 2021-09-17 PROCEDURE — 96160 PT-FOCUSED HLTH RISK ASSMT: CPT | Performed by: PHYSICIAN ASSISTANT

## 2021-09-17 NOTE — PROGRESS NOTES
The ABCs of the Annual Wellness Visit  Subsequent Medicare Wellness Visit    Chief Complaint   Patient presents with   • Medicare Wellness-subsequent   • Hypertension     Management   • Hyperlipidemia     Management      Subjective    History of Present Illness:  Sanjana Morse is a 76 y.o. female who presents for a Subsequent Medicare Wellness Visit ,Hypertension and Hyperlipidemia.  Sanjana states overall she has been feeling well.  She had an one episode this past Sunday of feeling dizzy and lightheaded.  States she had gone to [employer] and went out to eat when she returned home she felt a little lightheaded.  She got very tired and had to sit down.  After sitting for a period of time she got up and got a little lightheaded.  This only lasted for several minutes.  Has denied any chest pain, shortness of air, wheezing,current  dizziness, fevers, chills, upper respiratory symptoms, chest pain, wheezing, abdominal pain, GI upset, swelling of ankles or urinary symptoms.  States her allergies have been flaring up off and on.  Bowel movements have been normal without dark black tarry stools.    The following portions of the patient's history were reviewed and   updated as appropriate:   She  has no past medical history on file.  She does not have any pertinent problems on file.  She  has a past surgical history that includes Knee arthroscopy (Left, 03/05/2019).  Her family history is not on file.  She  reports that she has quit smoking. She has never used smokeless tobacco. No history on file for alcohol use and drug use.  Current Outpatient Medications   Medication Sig Dispense Refill   • BIOTIN PO Take  by mouth.     • Calcium Carb-Cholecalciferol (CALCIUM 1000 + D PO) Take  by mouth.     • Cholecalciferol (VITAMIN D3 PO) Take  by mouth.     • lisinopril (PRINIVIL,ZESTRIL) 5 MG tablet TAKE ONE TABLET BY MOUTH DAILY 90 tablet 0   • Multiple Vitamins-Minerals (PRESERVISION AREDS 2 PO) Take  by mouth.     • Specialty  Vitamins Products (MENOPAUSE SUPPORT PO) Take  by mouth.     • vitamin E 100 UNIT capsule Take 100 Units by mouth Daily.       No current facility-administered medications for this visit.     Current Outpatient Medications on File Prior to Visit   Medication Sig   • BIOTIN PO Take  by mouth.   • Calcium Carb-Cholecalciferol (CALCIUM 1000 + D PO) Take  by mouth.   • Cholecalciferol (VITAMIN D3 PO) Take  by mouth.   • lisinopril (PRINIVIL,ZESTRIL) 5 MG tablet TAKE ONE TABLET BY MOUTH DAILY   • Multiple Vitamins-Minerals (PRESERVISION AREDS 2 PO) Take  by mouth.   • Specialty Vitamins Products (MENOPAUSE SUPPORT PO) Take  by mouth.   • vitamin E 100 UNIT capsule Take 100 Units by mouth Daily.     No current facility-administered medications on file prior to visit.     She is allergic to hydrocodone..    Compared to one year ago, the patient feels her physical   health is the same.    Compared to one year ago, the patient feels her mental   health is the same.    Recent Hospitalizations:  She was not admitted to the hospital during the last year.       Current Medical Providers:  Patient Care Team:  Reyna Singh PA-C as PCP -   [facility] Vicente Gonzalez MD as Consulting Physician (Pulmonary Disease)    Outpatient Medications Prior to Visit   Medication Sig Dispense Refill   • BIOTIN PO Take  by mouth.     • Calcium Carb-Cholecalciferol (CALCIUM 1000 + D PO) Take  by mouth.     • Cholecalciferol (VITAMIN D3 PO) Take  by mouth.     • lisinopril (PRINIVIL,ZESTRIL) 5 MG tablet TAKE ONE TABLET BY MOUTH DAILY 90 tablet 0   • Multiple Vitamins-Minerals (PRESERVISION AREDS 2 PO) Take  by mouth.     • Specialty Vitamins Products (MENOPAUSE SUPPORT PO) Take  by mouth.     • vitamin E 100 UNIT capsule Take 100 Units by mouth Daily.       No facility-administered medications prior to visit.       No opioid medication identified on active medication list. I have reviewed chart for other potential  high risk medication/s  "and harmful drug interactions in the elderly.          Aspirin is not on active medication list.  Aspirin use is not indicated based on review of current medical condition/s. Risk of harm outweighs potential benefits.  .    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   • Derangement of posterior horn of medial meniscus   • Essential hypertension   • Osteoarthritis   • Postmenopausal status   • Medicare annual wellness visit, subsequent   • Postmenopause   • Need for pneumococcal vaccination   • Hypercholesterolemia   • Sinus headache   • Screening mammogram, encounter for   • Degenerative tear of posterior horn of medial meniscus of left knee   • Wheezing   • Cough   • Lung nodule < 6cm on CT   • COPD exacerbation (CMS/[facility])   • Obesity (BMI 30-39.9)   • Gastroesophageal reflux disease   • Colon cancer screening   • Urinary tract infection without hematuria     Advance Care Planning  Advance Directive is not on file.  ACP discussion was held with the patient during this visit. Patient has an advance directive (not in EMR), copy requested.          Objective    Vitals:    09/17/21 0900   BP: 120/68   Pulse: 71   Resp: 13   Temp: 97.1 °F (36.2 °C)   SpO2: 97%   Weight: 99.8 kg (220 lb)   Height: 172.7 cm (68\")   PainSc: 0-No pain     BMI Readings from Last 1 Encounters:   09/17/21 33.45 kg/m²   BMI is above normal parameters. Recommendations include: exercise counseling and nutrition counseling    Does the patient have evidence of cognitive impairment? No    Physical Exam  Vitals and nursing note reviewed.   Constitutional:       Appearance: Normal appearance. She is well-developed and well-groomed. She is obese.      Interventions: Face mask in place.   HENT:      Head: Normocephalic and atraumatic.      Jaw: There is normal jaw occlusion.      Right Ear: Hearing, tympanic membrane, ear canal and external ear normal.      Left Ear: Hearing, " tympanic membrane, ear canal and external ear normal.      Nose: Nose normal.      Right Sinus: No maxillary sinus tenderness or frontal sinus tenderness.      Left Sinus: No maxillary sinus tenderness or frontal sinus tenderness.      Mouth/Throat:      Lips: Pink.      Mouth: Mucous membranes are moist.      Dentition: Normal dentition.      Tongue: No lesions. Tongue does not deviate from midline.      Palate: No mass and lesions.      Pharynx: Oropharynx is clear. Uvula midline.      Tonsils: No tonsillar exudate.   Eyes:      General: Lids are normal.      Conjunctiva/sclera: Conjunctivae normal.      Pupils: Pupils are equal, round, and reactive to light.   Neck:      Thyroid: No thyroid mass, thyromegaly or thyroid tenderness.      Vascular: No carotid bruit.      Trachea: Trachea and phonation normal. No tracheal tenderness.   Cardiovascular:      Rate and Rhythm: Normal rate and regular rhythm.      Pulses: Normal pulses.      Heart sounds: Normal heart sounds, S1 normal and S2 normal. No murmur heard.     Pulmonary:      Effort: Pulmonary effort is normal.      Breath sounds: Normal breath sounds and air entry.   Abdominal:      General: Bowel sounds are normal.      Palpations: Abdomen is soft.      Tenderness: There is no abdominal tenderness. There is no right CVA tenderness, left CVA tenderness, guarding or rebound. Negative signs include Knox's sign, Rovsing's sign, McBurney's sign, psoas sign and obturator sign.   Musculoskeletal:      Cervical back: Neck supple.      Right lower leg: No edema.      Left lower leg: No edema.   Lymphadenopathy:      Cervical: No cervical adenopathy.      Right cervical: No superficial, deep or posterior cervical adenopathy.     Left cervical: No superficial, deep or posterior cervical adenopathy.   Skin:     General: Skin is warm and dry.      Capillary Refill: Capillary refill takes less than 2 seconds.   Neurological:      Mental Status: She is alert and oriented  to person, place, and time.      Deep Tendon Reflexes:      Reflex Scores:       Patellar reflexes are 2+ on the right side and 2+ on the left side.  Psychiatric:         Attention and Perception: Attention and perception normal.         Mood and Affect: Mood and affect normal.         Speech: Speech normal.         Behavior: Behavior is cooperative.         Thought Content: Thought content normal.         Cognition and Memory: Cognition and memory normal.         Judgment: Judgment normal.     I was wearing a surgical mask and face shield during the entire office visit/encounter.    Lab Results   Component Value Date    GLU 99 08/20/2021    CHLPL 207 (H) 08/20/2021    TRIG 153 (H) 08/20/2021    HDL 54 08/20/2021     (H) 08/20/2021    VLDL 27 08/20/2021            HEALTH RISK ASSESSMENT    Smoking Status:  Social History     Tobacco Use   Smoking Status Former Smoker   Smokeless Tobacco Never Used   Tobacco Comment    stop in 1988     Alcohol Consumption:  Social History     Substance and Sexual Activity   Alcohol Use None     Fall Risk Screen:    STEADI Fall Risk Assessment was completed, and patient is at LOW risk for falls.Assessment completed on:9/17/2021    Depression Screening:  PHQ-2/PHQ-9 Depression Screening 9/17/2021   Little interest or pleasure in doing things 0   Feeling down, depressed, or hopeless 0   Trouble falling or staying asleep, or sleeping too much 0   Feeling tired or having little energy 0   Poor appetite or overeating 0   Feeling bad about yourself - or that you are a failure or have let yourself or your family down 0   Trouble concentrating on things, such as reading the newspaper or watching television 0   Moving or speaking so slowly that other people could have noticed. Or the opposite - being so fidgety or restless that you have been moving around a lot more than usual 0   Thoughts that you would be better off dead, or of hurting yourself in some way 0   Total Score 0   If you  checked off any problems, how difficult have these problems made it for you to do your work, take care of things at home, or get along with other people? Not difficult at all       Health Habits and Functional and Cognitive Screening:  Functional & Cognitive Status 9/17/2021   Do you have difficulty preparing food and eating? No   Do you have difficulty bathing yourself, getting dressed or grooming yourself? No   Do you have difficulty using the toilet? No   Do you have difficulty moving around from place to place? No   Do you have trouble with steps or getting out of a bed or a chair? No   Current Diet Well Balanced Diet   Dental Exam Up to date   Eye Exam Up to date   Exercise (times per week) 4 times per week   Current Exercises Include Walking   Current Exercise Activities Include -   Do you need help using the phone?  No   Are you deaf or do you have serious difficulty hearing?  No   Do you need help with transportation? No   Do you need help shopping? No   Do you need help preparing meals?  No   Do you need help with housework?  No   Do you need help with laundry? No   Do you need help taking your medications? No   Do you need help managing money? No   Do you ever drive or ride in a car without wearing a seat belt? No   Have you felt unusual stress, anger or loneliness in the last month? No   Who do you live with? Spouse   If you need help, do you have trouble finding someone available to you? No   Have you been bothered in the last four weeks by sexual problems? No   Do you have difficulty concentrating, remembering or making decisions? No       Age-appropriate Screening Schedule:  Refer to the list below for future screening recommendations based on patient's age, sex and/or medical conditions. Orders for these recommended tests are listed in the plan section. The patient has been provided with a written plan.    Health Maintenance   Topic Date Due   • DXA SCAN  09/03/2021   • INFLUENZA VACCINE  10/01/2021    • LIPID PANEL  08/20/2022   • MAMMOGRAM  02/22/2023   • TDAP/TD VACCINES (2 - Td or Tdap) 11/28/2026   • ZOSTER VACCINE  Completed              Assessment/Plan   CMS Preventative Services Quick Reference  Risk Factors Identified During Encounter  Cardiovascular Disease  Immunizations Discussed/Encouraged (specific Immunizations; Influenza  Obesity/Overweight   Dexa   The above risks/problems have been discussed with the patient.  Follow up actions/plans if indicated are seen below in the Assessment/Plan Section.  Pertinent information has been shared with the patient in the After Visit Summary.        ECG 12 Lead    Date/Time: 9/17/2021 9:48 AM  Performed by: Reyna Singh PA-C  Authorized by: Reyna Singh PA-C   Comparison: compared with previous ECG from 2/24/2021  Similar to previous ECG  Rhythm: sinus rhythm  Rate: normal  BPM: 62  Conduction: conduction normal  ST Segments: ST segments normal  T Waves: T waves normal  QRS axis: normal    Clinical impression: non-specific ECG  Comments: Indication:  New dizziness with Hypertension  CA int:  178 ms  QRS dur:  84 ms  QT/QTc:  388/392 ms          Diagnoses and all orders for this visit:    1. Medicare annual wellness visit, subsequent (Primary)    2. Essential hypertension  -     ECG 12 Lead    3. Hypercholesterolemia    4. Post-menopausal  -     DEXA Bone Density Axial; Future    5. Osteopenia, unspecified location  -     DEXA Bone Density Axial; Future    6. Dizziness  -     ECG 12 Lead    1.  Annual Human sequential Medicare wellness examination with hyperlipidemia: I have reviewed above blood work with her at office visit today.  Cholesterol readings are elevated.  She will try to eat healthier by adding avocados and cinnamon to diet.  We will continue her current medications at home as directed.  Will return to office in 6 months for fasting lab work.  2.  New dizziness with hypertension: In office EKG showed normal sinus rhythm without change  from previous EKG.  I suspect her dizziness may be related to fluctuation in blood sugar or allergies.  I will continue to monitor.  Sanjana will continue her current blood pressure medication at home.  3.  Chronic osteopenia with postmenopausal: I  will check DEXA scan at [facility].  Will be notified of test results when completed.      Follow Up:   No follow-ups on file.     An After Visit Summary and PPPS were made available to the patient.               Patient Counseling:  --Nutrition: Stressed importance of moderation in sodium/caffeine intake, saturated fat and cholesterol.  Discussed caloric balance, sufficient intake of fresh fruits, vegetables, fiber,   calcium, iron.  --Discussed the new recommendation against daily use of baby aspirin for primary prevention in low risk patients.  --Exercise: Stressed the importance of regular exercise by incorporating into daily routine.    --Substance Abuse: Discussed cessation/primary prevention of tobacco, alcohol, or other drug use; driving or other dangerous activities under the influence.    --Dental health: Discussed importance of regular tooth brushing, flossing, and dental visits.  -- Suggested having eyes and vision checked if needed or past due.  --Immunizations reviewed.  --Discussed benefits of screening colonoscopy.      BRENDA Garcia [facility]  [address] [facility] [id number]  Dept: [phone]  Dept Fax: [phone]  Loc: [phone]  Loc Fax: [phone]

## 2021-09-24 ENCOUNTER — HOSPITAL ENCOUNTER (OUTPATIENT)
Dept: BONE DENSITY | Facility: HOSPITAL | Age: 76
Discharge: HOME | End: 2021-09-24

## 2021-09-24 DIAGNOSIS — Z78.0 POST-MENOPAUSAL: ICD-10-CM

## 2021-09-24 DIAGNOSIS — M85.80 OSTEOPENIA, UNSPECIFIED LOCATION: ICD-10-CM

## 2021-10-26 ENCOUNTER — TELEPHONE (OUTPATIENT)
Dept: FAMILY MEDICINE CLINIC | Facility: CLINIC | Age: 76
End: 2021-10-26

## 2021-10-26 NOTE — TELEPHONE ENCOUNTER
Caller: Sanjana Morse    Relationship to patient: Self    Best call back number: [phone]    Patient is needing: PATIENT RECEIVED HER FLU SHOT THIS MORNING AT [facility]

## 2021-11-08 DIAGNOSIS — I10 ESSENTIAL HYPERTENSION: ICD-10-CM

## 2021-11-08 RX ORDER — LISINOPRIL 5 MG/1
TABLET ORAL
Qty: 90 TABLET | Refills: 0 | Status: SHIPPED | OUTPATIENT
Start: 2021-11-08 | End: 2022-02-07 | Stop reason: SDUPTHER

## 2022-02-07 ENCOUNTER — TELEPHONE (OUTPATIENT)
Dept: FAMILY MEDICINE CLINIC | Facility: CLINIC | Age: 77
End: 2022-02-07

## 2022-02-07 DIAGNOSIS — I10 ESSENTIAL HYPERTENSION: ICD-10-CM

## 2022-02-07 RX ORDER — LISINOPRIL 5 MG/1
5 TABLET ORAL DAILY
Qty: 90 TABLET | Refills: 0 | Status: SHIPPED | OUTPATIENT
Start: 2022-02-07 | End: 2022-05-09

## 2022-02-07 NOTE — TELEPHONE ENCOUNTER
Caller: Sanjana Morse    Relationship: Self    Best call back number: [phone]    Requested Prescriptions:   Requested Prescriptions     Pending Prescriptions Disp Refills   • lisinopril (PRINIVIL,ZESTRIL) 5 MG tablet 90 tablet 0     Sig: Take 1 tablet by mouth Daily.        Pharmacy where request should be sent: [id number] [place] [address] AT [facility] & [facility] [phone] [facility] [phone] FX     Additional details provided by patient: PATIENT HAS 6 PILLS LEFT    Does the patient have less than a 3 day supply:  [] Yes  [x] No    Dano Stanford Rep   02/07/22 11:27 EST

## 2022-02-21 ENCOUNTER — TELEPHONE (OUTPATIENT)
Dept: FAMILY MEDICINE CLINIC | Facility: CLINIC | Age: 77
End: 2022-02-21

## 2022-02-21 DIAGNOSIS — Z12.31 SCREENING MAMMOGRAM FOR BREAST CANCER: Primary | ICD-10-CM

## 2022-02-21 NOTE — TELEPHONE ENCOUNTER
[facility] call requesting a 3D mammogram bilteral for pt.     Their fax number is [phone]

## 2022-03-22 DIAGNOSIS — E78.00 HYPERCHOLESTEROLEMIA: ICD-10-CM

## 2022-03-22 DIAGNOSIS — I10 ESSENTIAL HYPERTENSION: Primary | ICD-10-CM

## 2022-03-24 LAB
ALBUMIN SERPL-MCNC: 4.2 G/DL (ref 3.7–4.7)
ALBUMIN/GLOB SERPL: 1.7 {RATIO} (ref 1.2–2.2)
ALP SERPL-CCNC: 69 IU/L (ref 44–121)
ALT SERPL-CCNC: 33 IU/L (ref 0–32)
AST SERPL-CCNC: 28 IU/L (ref 0–40)
BASOPHILS # BLD AUTO: 0 X10E3/UL (ref 0–0.2)
BASOPHILS NFR BLD AUTO: 1 %
BILIRUB SERPL-MCNC: 0.3 MG/DL (ref 0–1.2)
BUN SERPL-MCNC: 19 MG/DL (ref 8–27)
BUN/CREAT SERPL: 21 (ref 12–28)
CALCIUM SERPL-MCNC: 9.6 MG/DL (ref 8.7–10.3)
CHLORIDE SERPL-SCNC: 101 MMOL/L (ref 96–106)
CHOLEST SERPL-MCNC: 202 MG/DL (ref 100–199)
CO2 SERPL-SCNC: 26 MMOL/L (ref 20–29)
CREAT SERPL-MCNC: 0.9 MG/DL (ref 0.57–1)
EGFRCR SERPLBLD CKD-EPI 2021: 65 ML/MIN/1.73
EOSINOPHIL # BLD AUTO: 0.2 X10E3/UL (ref 0–0.4)
EOSINOPHIL NFR BLD AUTO: 3 %
ERYTHROCYTE [DISTWIDTH] IN BLOOD BY AUTOMATED COUNT: 11.6 % (ref 11.7–15.4)
GLOBULIN SER CALC-MCNC: 2.5 G/DL (ref 1.5–4.5)
GLUCOSE SERPL-MCNC: 104 MG/DL (ref 65–99)
HCT VFR BLD AUTO: 41 % (ref 34–46.6)
HDLC SERPL-MCNC: 56 MG/DL
HGB BLD-MCNC: 14.1 G/DL (ref 11.1–15.9)
IMM GRANULOCYTES # BLD AUTO: 0 X10E3/UL (ref 0–0.1)
IMM GRANULOCYTES NFR BLD AUTO: 0 %
LDLC SERPL CALC-MCNC: 129 MG/DL (ref 0–99)
LDLC/HDLC SERPL: 2.3 RATIO (ref 0–3.2)
LYMPHOCYTES # BLD AUTO: 2.4 X10E3/UL (ref 0.7–3.1)
LYMPHOCYTES NFR BLD AUTO: 40 %
MCH RBC QN AUTO: 31.4 PG (ref 26.6–33)
MCHC RBC AUTO-ENTMCNC: 34.4 G/DL (ref 31.5–35.7)
MCV RBC AUTO: 91 FL (ref 79–97)
MONOCYTES # BLD AUTO: 0.6 X10E3/UL (ref 0.1–0.9)
MONOCYTES NFR BLD AUTO: 11 %
NEUTROPHILS # BLD AUTO: 2.7 X10E3/UL (ref 1.4–7)
NEUTROPHILS NFR BLD AUTO: 45 %
PLATELET # BLD AUTO: 242 X10E3/UL (ref 150–450)
POTASSIUM SERPL-SCNC: 4.5 MMOL/L (ref 3.5–5.2)
PROT SERPL-MCNC: 6.7 G/DL (ref 6–8.5)
RBC # BLD AUTO: 4.49 X10E6/UL (ref 3.77–5.28)
SODIUM SERPL-SCNC: 140 MMOL/L (ref 134–144)
TRIGL SERPL-MCNC: 95 MG/DL (ref 0–149)
VLDLC SERPL CALC-MCNC: 17 MG/DL (ref 5–40)
WBC # BLD AUTO: 5.9 X10E3/UL (ref 3.4–10.8)

## 2022-03-25 DIAGNOSIS — B00.1 HERPES LABIALIS: Primary | ICD-10-CM

## 2022-03-25 RX ORDER — VALACYCLOVIR HYDROCHLORIDE 1 G/1
TABLET, FILM COATED ORAL
Qty: 8 TABLET | Refills: 0 | Status: SHIPPED | OUTPATIENT
Start: 2022-03-25

## 2022-03-30 ENCOUNTER — OFFICE VISIT (OUTPATIENT)
Dept: FAMILY MEDICINE CLINIC | Facility: CLINIC | Age: 77
End: 2022-03-30

## 2022-03-30 VITALS
TEMPERATURE: 98 F | WEIGHT: 223 LBS | BODY MASS INDEX: 33.8 KG/M2 | HEART RATE: 69 BPM | OXYGEN SATURATION: 99 % | HEIGHT: 68 IN | DIASTOLIC BLOOD PRESSURE: 78 MMHG | SYSTOLIC BLOOD PRESSURE: 138 MMHG

## 2022-03-30 DIAGNOSIS — E78.00 HYPERCHOLESTEROLEMIA: ICD-10-CM

## 2022-03-30 DIAGNOSIS — Z00.00 MEDICARE ANNUAL WELLNESS VISIT, SUBSEQUENT: Primary | ICD-10-CM

## 2022-03-30 DIAGNOSIS — I10 ESSENTIAL HYPERTENSION: Primary | ICD-10-CM

## 2022-03-30 DIAGNOSIS — K21.00 GASTROESOPHAGEAL REFLUX DISEASE WITH ESOPHAGITIS WITHOUT HEMORRHAGE: ICD-10-CM

## 2022-03-30 PROCEDURE — 99213 OFFICE O/P EST LOW 20 MIN: CPT | Performed by: PHYSICIAN ASSISTANT

## 2022-03-30 NOTE — PROGRESS NOTES
"Chief Complaint  Hypertension (management), Hyperlipidemia (management), and Heartburn (management)    Subjective          Sanjana Morse presents to [facility]  History of Present Illness  Sanjana is a 77-year-old female who presents for hypertension, hyperlipidemia and  heartburn management.  States she has been taking Pepcid twice daily for heartburn issues.  States she does not drink any caffeine.  She does drink orange juice and eats acidic foods.  Sometimes she will eat tomato/spicy food intake.  Does eat at least 2-3 small pieces of chocolate daily.  States she is trying to get her  to raise the bed.  States the medication has helped.  Diet has been slightly healthy.  She has been trying to walk for exercise.  States recently due to weather she has not been walking as much.  Doing well with her medications.  Denied any chest pain, shortness of air, wheezing, fevers, chills, cough, nausea, vomiting, diarrhea or swelling of ankles.  Sleep has been normal.  Review of Systems   Gastrointestinal:        Heartburn   All other systems reviewed and are negative.    Objective   Vital Signs:   /78 (BP Location: Left arm, Patient Position: Sitting, Cuff Size: Adult)   Pulse 69   Temp 98 °F (36.7 °C)   Ht 172.7 cm (68\")   Wt 101 kg (223 lb)   SpO2 99%   BMI 33.91 kg/m²     Physical Exam  Vitals and nursing note reviewed.   Constitutional:       Appearance: Normal appearance. She is well-developed and well-groomed. She is obese.      Interventions: Face mask in place.   HENT:      Head: Normocephalic and atraumatic.   Neck:      Thyroid: No thyroid mass, thyromegaly or thyroid tenderness.      Vascular: No carotid bruit.      Trachea: Trachea and phonation normal. No tracheal tenderness.   Cardiovascular:      Rate and Rhythm: Normal rate and regular rhythm.      Pulses: Normal pulses.      Heart sounds: Normal heart sounds, S1 normal and S2 normal. No murmur " heard.  Pulmonary:      Effort: Pulmonary effort is normal.      Breath sounds: Normal breath sounds and air entry.   Abdominal:      General: Bowel sounds are normal.      Palpations: Abdomen is soft. There is no hepatomegaly.      Tenderness: There is no abdominal tenderness. There is no right CVA tenderness, left CVA tenderness, guarding or rebound. Negative signs include Knox's sign, Rovsing's sign, McBurney's sign, psoas sign and obturator sign.   Musculoskeletal:      Cervical back: Neck supple.      Right lower leg: No edema.      Left lower leg: No edema.   Skin:     General: Skin is warm and dry.      Capillary Refill: Capillary refill takes less than 2 seconds.   Neurological:      Mental Status: She is alert and oriented to person, place, and time.   Psychiatric:         Attention and Perception: Attention and perception normal.         Mood and Affect: Mood and affect normal.         Speech: Speech normal.         Behavior: Behavior normal. Behavior is cooperative.         Thought Content: Thought content normal.         Cognition and Memory: Cognition and memory normal.         Judgment: Judgment normal.     I wore a facial mask, face shield, and gloves during this patient encounter.  Patient also wearing a surgical mask.  Hand hygiene performed before and after seeing the patient.     Result Review :     CMP    CMP 8/20/21 3/23/22   Glucose 99 104 (A)   BUN 21 19   Creatinine 0.74 0.90   eGFR Non  Am 76    eGFR  Am 92    Sodium 138 140   Potassium 4.5 4.5   Chloride 101 101   Calcium 10.0 9.6   Total Protein 6.7 6.7   Albumin 4.30 4.2   Globulin 2.4 2.5   Total Bilirubin 0.5 0.3   Alkaline Phosphatase 69 69   AST (SGOT) 22 28   ALT (SGPT) 24 33 (A)   (A) Abnormal value       Comments are available for some flowsheets but are not being displayed.           CBC w/diff    CBC w/Diff 8/20/21 3/23/22   WBC 6.13 5.9   RBC 4.48 4.49   Hemoglobin 13.8 14.1   Hematocrit 41.4 41.0   MCV 92.4 91    MCH 30.8 31.4   MCHC 33.3 34.4   RDW 11.8 (A) 11.6 (A)   Platelets 241 242   Neutrophil Rel % 50.5 45   Lymphocyte Rel % 37.5 40   Monocyte Rel % 9.3 11   Eosinophil Rel % 2.0 3   Basophil Rel % 0.5 1   (A) Abnormal value            Lipid Panel    Lipid Panel 8/20/21 3/23/22   Total Cholesterol 207 (A) 202 (A)   Triglycerides 153 (A) 95   HDL Cholesterol 54 56   VLDL Cholesterol 27 17   LDL Cholesterol  126 (A) 129 (A)   LDL/HDL Ratio 2.27 2.3   (A) Abnormal value       Comments are available for some flowsheets but are not being displayed.                         Assessment and Plan    Diagnoses and all orders for this visit:    1. Essential hypertension (Primary)    2. Hypercholesterolemia    3. Gastroesophageal reflux disease with esophagitis without hemorrhage    1.  Chronic and stable hypertension: Blood pressure was in therapeutic range today.  She will continue current medication at home as directed.  2.  Chronic and stable hypercholesteremia: I have reviewed above blood work with her today.  Triglycerides have returned to normal.  Cholesterol readings have decreased very little.  Stressed the importance of decreasing fatty food in diet and increasing physical activity.  She refuses statin medication at this time.  Will recheck fasting labs in 6 months with Medicare wellness exam.  3.  Chronic and stable GERD: She has been taking over-the-counter Pepcid twice daily.  Stressed the importance of decreasing her spicy food intake, chocolate intake and acidic foods.  Will consider GI appointment in future if no improvement.      Follow Up   Return in about 6 months (around 9/19/2022), or labs, for Medicare Wellness.  Patient was given instructions and counseling regarding her condition or for health maintenance advice. Please see specific information pulled into the AVS if appropriate.     [facility]  [address]  [place] [address]  Dept: [phone]  Dept Fax: [phone]  Loc: [phone]  Loc Fax: [phone]

## 2022-05-09 DIAGNOSIS — I10 ESSENTIAL HYPERTENSION: ICD-10-CM

## 2022-05-09 RX ORDER — LISINOPRIL 5 MG/1
TABLET ORAL
Qty: 90 TABLET | Refills: 0 | Status: SHIPPED | OUTPATIENT
Start: 2022-05-09 | End: 2022-08-16

## 2022-07-13 ENCOUNTER — OFFICE VISIT (OUTPATIENT)
Dept: FAMILY MEDICINE CLINIC | Facility: CLINIC | Age: 77
End: 2022-07-13

## 2022-07-13 VITALS
OXYGEN SATURATION: 98 % | HEART RATE: 69 BPM | HEIGHT: 68 IN | BODY MASS INDEX: 34.1 KG/M2 | DIASTOLIC BLOOD PRESSURE: 84 MMHG | WEIGHT: 225 LBS | SYSTOLIC BLOOD PRESSURE: 130 MMHG | TEMPERATURE: 97.1 F

## 2022-07-13 DIAGNOSIS — L02.91 ABSCESS: ICD-10-CM

## 2022-07-13 DIAGNOSIS — L08.9 SKIN INFECTION: Primary | ICD-10-CM

## 2022-07-13 PROCEDURE — 99214 OFFICE O/P EST MOD 30 MIN: CPT | Performed by: FAMILY MEDICINE

## 2022-07-13 RX ORDER — CEPHALEXIN 500 MG/1
500 CAPSULE ORAL 3 TIMES DAILY
Qty: 21 CAPSULE | Refills: 0 | Status: SHIPPED | OUTPATIENT
Start: 2022-07-13 | End: 2022-07-20

## 2022-07-13 NOTE — PROGRESS NOTES
Chief Complaint   Patient presents with   • Abscess     On bottom       Subjective   Sanjana Morse is a 77 y.o. female.     History of Present Illness     77-year-old female    Acute visit.  Concern for boil left buttocks region.  She had a similar boil that self resolved several weeks prior.  No fevers or chills.  Otherwise feels well.  Has been using dry heat.  Has been present for about 5 days.  No other acute complaints. No diabetes history. No prior similar incident     The following portions of the patient's history were reviewed and updated as appropriate: allergies, current medications, past family history, past medical history, past social history, past surgical history and problem list.      Past Medical History:   Diagnosis Date   • Asthma July, 2019    Maybe once or twice a year broncle asthma   • Cancer (HCC) ?    Skin cancer spots   • Cataract 2017    Removed and lens inserted   • Diverticulosis Colonoscopy showed this.   • GERD (gastroesophageal reflux disease) Four years or more    Meds when meeded   • Hypertension 15 years or so?    Take lisinopril   • Pneumonia July 2019   • Renal insufficiency    • Urinary tract infection Couple last two years       Past Surgical History:   Procedure Laterality Date   • APPENDECTOMY  1986   • CHOLECYSTECTOMY  1977   • COLONOSCOPY  Sept., 2020   • EYE SURGERY  2017    Cateracts removed   • HYSTERECTOMY  1986   • KNEE ARTHROSCOPY Left 03/05/2019       No family history on file.    Social History     Socioeconomic History   • Marital status:    Tobacco Use   • Smoking status: Former Smoker     Packs/day: 1.00     Years: 24.00     Pack years: 24.00     Types: Cigarettes   • Smokeless tobacco: Never Used   • Tobacco comment: Not smoked for 33 years. Quit  1988.   Vaping Use   • Vaping Use: Never used   Substance and Sexual Activity   • Alcohol use: Yes   • Drug use: Never       Current Outpatient Medications on File Prior to Visit   Medication Sig Dispense  Refill   • BIOTIN PO Take  by mouth.     • Calcium Carb-Cholecalciferol (CALCIUM 1000 + D PO) Take  by mouth.     • Cholecalciferol (VITAMIN D3 PO) Take  by mouth.     • lisinopril (PRINIVIL,ZESTRIL) 5 MG tablet TAKE ONE TABLET BY MOUTH DAILY 90 tablet 0   • Multiple Vitamins-Minerals (PRESERVISION AREDS 2 PO) Take  by mouth.     • Specialty Vitamins Products (MENOPAUSE SUPPORT PO) Take  by mouth.     • valACYclovir (Valtrex) 1000 MG tablet Take 2 tablets twice a day as needed for fever blisters 8 tablet 0   • vitamin E 100 UNIT capsule Take 100 Units by mouth Daily.       No current facility-administered medications on file prior to visit.       Review of Systems   Constitutional: Negative for fever.   HENT: Negative for congestion.    Respiratory: Negative for shortness of breath.    Cardiovascular: Negative for chest pain.   Gastrointestinal: Negative for abdominal pain.   Genitourinary: Negative for decreased urine volume.   Neurological: Negative for weakness.           Vitals:    07/13/22 1528   BP: 130/84   Pulse: 69   Temp: 97.1 °F (36.2 °C)   SpO2: 98%      Objective   Physical Exam  Vitals reviewed.   Eyes:      Pupils: Pupils are equal, round, and reactive to light.   Cardiovascular:      Rate and Rhythm: Normal rate.      Pulses: Normal pulses.   Pulmonary:      Effort: Pulmonary effort is normal.   Abdominal:      General: Abdomen is flat.   Skin:     General: Skin is warm.      Capillary Refill: Capillary refill takes less than 2 seconds.   Neurological:      Mental Status: She is alert.     area of 1 cm redness/tenderness L buttock region, no drainage, not fluctuant     Assessment & Plan   Problems Addressed this Visit    None     Visit Diagnoses     Skin infection    -  Primary    Relevant Medications    cephalexin (Keflex) 500 MG capsule    Other Relevant Orders    Ambulatory Referral to General Surgery    Abscess        Relevant Orders    Ambulatory Referral to General Surgery      Diagnoses        Codes Comments    Skin infection    -  Primary ICD-10-CM: L08.9  ICD-9-CM: 686.9     Abscess     ICD-10-CM: L02.91  ICD-9-CM: 682.9         Given the sensitive location and concern for worsening the complaint, would start with wet warm compress, betadine and an antibiotic  Will schedule with surgery or us if drainage needed           Ladi Wilburn MD

## 2022-07-14 ENCOUNTER — TELEPHONE (OUTPATIENT)
Dept: FAMILY MEDICINE CLINIC | Facility: CLINIC | Age: 77
End: 2022-07-14

## 2022-07-14 ENCOUNTER — OFFICE VISIT (OUTPATIENT)
Dept: SURGERY | Facility: CLINIC | Age: 77
End: 2022-07-14

## 2022-07-14 VITALS — BODY MASS INDEX: 34.07 KG/M2 | HEIGHT: 68 IN | WEIGHT: 224.8 LBS

## 2022-07-14 DIAGNOSIS — L02.31 LEFT BUTTOCK ABSCESS: Primary | ICD-10-CM

## 2022-07-14 PROCEDURE — 10060 I&D ABSCESS SIMPLE/SINGLE: CPT | Performed by: SURGERY

## 2022-07-14 PROCEDURE — 99203 OFFICE O/P NEW LOW 30 MIN: CPT | Performed by: SURGERY

## 2022-07-14 PROCEDURE — 87186 SC STD MICRODIL/AGAR DIL: CPT | Performed by: SURGERY

## 2022-07-14 PROCEDURE — 87205 SMEAR GRAM STAIN: CPT | Performed by: SURGERY

## 2022-07-14 PROCEDURE — 87070 CULTURE OTHR SPECIMN AEROBIC: CPT | Performed by: SURGERY

## 2022-07-14 NOTE — PROGRESS NOTES
Subjective   Sanjana Morse is a 77 y.o. female who presents to the office in surgical consultation from Reyna Singh PA-C for a left buttock abscess.    History of Present Illness     The patient has a 1 week history of a painful area on her left buttock.  It started off as a small lesion that she thought was a pimple and has gotten much larger.  It has been painful just to sit over the past 2 days or so.  She was seen by her primary care provider and started on Keflex yesterday.  She is taken 4 doses of Keflex without much improvement.  She has not had any drainage.  She believes it started from sitting and sweating on a zero turn lawnmower.    Review of Systems   Constitutional: Negative for fatigue and fever.   Respiratory: Negative for chest tightness and shortness of breath.    Cardiovascular: Negative for chest pain and palpitations.   Gastrointestinal: Negative for abdominal pain, blood in stool, constipation, diarrhea, nausea and vomiting.     Past Medical History:   Diagnosis Date   • Asthma July, 2019    Maybe once or twice a year broncle asthma   • Cancer (HCC) ?    Skin cancer spots   • Cataract 2017    Removed and lens inserted   • Diverticulosis Colonoscopy showed this.   • GERD (gastroesophageal reflux disease) Four years or more    Meds when meeded   • Hypertension 15 years or so?    Take lisinopril   • Pneumonia July 2019   • Renal insufficiency    • Urinary tract infection Couple last two years     Past Surgical History:   Procedure Laterality Date   • APPENDECTOMY  1986   • CHOLECYSTECTOMY  1977   • COLONOSCOPY  Sept., 2020   • EYE SURGERY  2017    Cateracts removed   • HYSTERECTOMY  1986   • KNEE ARTHROSCOPY Left 03/05/2019     Family History   Problem Relation Age of Onset   • Early death Brother         Polio     Social History     Socioeconomic History   • Marital status:    Tobacco Use   • Smoking status: Former Smoker     Packs/day: 1.00     Years: 24.00     Pack years:  24.00     Types: Cigarettes, Cigarettes   • Smokeless tobacco: Never Used   • Tobacco comment: Not smoked for 33 years. Quit  1988.   Vaping Use   • Vaping Use: Never used   Substance and Sexual Activity   • Alcohol use: Yes   • Drug use: Never   • Sexual activity: Defer       Objective   Physical Exam  Constitutional:       Appearance: Normal appearance. She is well-developed. She is not toxic-appearing.   Eyes:      General: No scleral icterus.  Pulmonary:      Effort: Pulmonary effort is normal. No respiratory distress.   Skin:     General: Skin is warm and dry.      Comments: There is a 2 cm raised erythematous lesion along the medial aspect of the left buttock just inside the gluteal fold not far from the anus.  There is palpable fluctuance and surrounding induration.   Neurological:      Mental Status: She is alert and oriented to person, place, and time.   Psychiatric:         Behavior: Behavior normal.         Thought Content: Thought content normal.         Judgment: Judgment normal.     Procedure  The area of the abscess was prepped and draped in the standard surgical fashion.  It was infiltrated with 2% lidocaine without epinephrine.  An incision was made with a scalpel carried through the skin into the abscess cavity.  The abscess was completely evacuated and cultures were taken.  It was irrigated with local anesthetic.  It was then packed with iodoform gauze.  Dressing was applied.  The patient tolerated the procedure well.    Assessment & Plan     1.  Left buttock abscess: The patient has an abscess of the left buttock and an incision and drainage of the abscess was performed in the office.  Local wound care was discussed with her.  The location of the abscess raises concern over the presence of a fistula and this abscess being the primary manifestation of fistula in ano.  This was also discussed with the patient.  She will return to the office if she has intermittent soilage after the abscess has  healed.

## 2022-07-14 NOTE — TELEPHONE ENCOUNTER
Did you put the referral in as stat? If not, it could take 7-14 days       ----- Message from Ladi Wilburn MD sent at 7/14/2022  8:21 AM EDT -----  Hi,    Can you call and cancel this patient's appointment with me for Friday. I would prefer she see Dr Kaplan in [place] or any of the office providers (for an office outpatient specialist appointment/with surgery) if the skin infection is not getting better given the location of it and risk for infection.     In meantime have her continue the warm compresses, can apply Betadine to the area, and take the antibiotic     Thank you

## 2022-07-17 LAB
BACTERIA SPEC AEROBE CULT: ABNORMAL
BACTERIA SPEC AEROBE CULT: ABNORMAL
GRAM STN SPEC: ABNORMAL
GRAM STN SPEC: ABNORMAL

## 2022-08-16 DIAGNOSIS — I10 ESSENTIAL HYPERTENSION: ICD-10-CM

## 2022-08-16 RX ORDER — LISINOPRIL 5 MG/1
TABLET ORAL
Qty: 90 TABLET | Refills: 0 | Status: SHIPPED | OUTPATIENT
Start: 2022-08-16 | End: 2022-11-03

## 2022-08-17 ENCOUNTER — OFFICE VISIT (OUTPATIENT)
Dept: FAMILY MEDICINE CLINIC | Facility: CLINIC | Age: 77
End: 2022-08-17

## 2022-08-17 VITALS
HEART RATE: 75 BPM | OXYGEN SATURATION: 98 % | DIASTOLIC BLOOD PRESSURE: 76 MMHG | WEIGHT: 224 LBS | BODY MASS INDEX: 33.95 KG/M2 | SYSTOLIC BLOOD PRESSURE: 178 MMHG | TEMPERATURE: 98 F | HEIGHT: 68 IN

## 2022-08-17 DIAGNOSIS — R50.9 FEVER, UNSPECIFIED FEVER CAUSE: Primary | ICD-10-CM

## 2022-08-17 DIAGNOSIS — U07.1 COVID-19 VIRUS INFECTION: ICD-10-CM

## 2022-08-17 DIAGNOSIS — R05.9 COUGH: ICD-10-CM

## 2022-08-17 LAB
EXPIRATION DATE: ABNORMAL
FLUAV AG UPPER RESP QL IA.RAPID: NOT DETECTED
FLUBV AG UPPER RESP QL IA.RAPID: NOT DETECTED
INTERNAL CONTROL: ABNORMAL
Lab: ABNORMAL
SARS-COV-2 AG UPPER RESP QL IA.RAPID: DETECTED

## 2022-08-17 PROCEDURE — 99213 OFFICE O/P EST LOW 20 MIN: CPT | Performed by: FAMILY MEDICINE

## 2022-08-17 PROCEDURE — 87428 SARSCOV & INF VIR A&B AG IA: CPT | Performed by: FAMILY MEDICINE

## 2022-08-17 RX ORDER — ALBUTEROL SULFATE 90 UG/1
2 AEROSOL, METERED RESPIRATORY (INHALATION)
OUTPATIENT
Start: 2022-08-17 | End: 2023-08-17

## 2022-08-17 NOTE — PROGRESS NOTES
"  Chief Complaint   Patient presents with   • Fever   • Cough   • URI       Upper Respiratory Infection: Patient complains of symptoms of a URI. Symptoms include congestion, coryza and cough. Onset of symptoms was 2 days ago, unchanged since that time. She also c/o achiness for the past 2 days .  She is drinking moderate amounts of fluids. Evaluation to date: none. Treatment to date: none.  Ill contacts at home or school or work discussed.      Vitals:    08/17/22 1336   BP: 178/76   Pulse: 75   Temp: 98 °F (36.7 °C)   SpO2: 98%   Weight: 102 kg (224 lb)   Height: 172.7 cm (67.99\")     Gen:, alert  Ears: Tm's bulging without redness  Nose:  Congestion  Throat:  Red without exudate, some drainage, tonsils okay  Neck: no LAD  Lung: RRR, good air movement, regular RR  Heart: RR without murmur        Assessment & Plan   Diagnoses and all orders for this visit:    1. Fever, unspecified fever cause (Primary)  -     POCT SARS-CoV-2 Antigen JR + Flu    2. Cough  -     POCT SARS-CoV-2 Antigen JR + Flu    3. COVID-19 virus infection  -     Nirmatrelvir & Ritonavir (PAXLOVID) 20 x 150 MG & 10 x 100MG tablet therapy pack tablet; Take 3 tablets by mouth 2 (Two) Times a Day for 5 days.  Dispense: 30 tablet; Refill: 0    Notify if symptoms worsen seek further care     Tylenol or Advil as needed for pain, fever, muscle aches  Plenty of fluids  Hand washing discussed  Off work or school note given if needed.  Warm tea for throat.  Pros and cons of antibiotic use discussed    Dr. Ladi Wilburn MD  Family Practice  [place], [place]  [facility]    "

## 2022-08-17 NOTE — TELEPHONE ENCOUNTER
Caller: Sanjana Morse    Relationship: Self    Best call back number: [phone]    What medication are you requesting:     What are your current symptoms: HOARSE, RUNNY NOSE AND SCRATCHY THROAT, ALLERGY ISSUES     How long have you been experiencing symptoms: YESTERDAY     Have you had these symptoms before:    [x] Yes  [] No    Have you been treated for these symptoms before:   [x] Yes  [] No    If a prescription is needed, what is your preferred pharmacy and phone number:        Additional notes: PATIENT IS ALSO ASKING ABOUT A STEROID PACK BECAUSE SHE GETS THIS EVERY FALL.

## 2022-08-22 ENCOUNTER — TELEPHONE (OUTPATIENT)
Dept: FAMILY MEDICINE CLINIC | Facility: CLINIC | Age: 77
End: 2022-08-22

## 2022-08-22 NOTE — TELEPHONE ENCOUNTER
Caller: Sanjana Morse    Relationship: Self    Best call back number: [phone]    What medication are you requesting: SYMBICORT AND ALBUTEROL INHALERS    What are your current symptoms: SHORTNESS OF AIR DUE TO RECENTLY GETTING EXPERIENCING COVID-19    How long have you been experiencing symptoms: ALMOST ONE WEEK    Have you had these symptoms before:    [x] Yes  [] No    Have you been treated for these symptoms before:   [x] Yes  [] No    If a prescription is needed, what is your preferred pharmacy and phone number: [facility] [address], [place] [address] [address] [address] [facility] [address] [facility] [phone] [facility] [phone] FX

## 2022-08-23 DIAGNOSIS — R05.9 COUGH: Primary | ICD-10-CM

## 2022-08-23 DIAGNOSIS — U07.1 COVID-19 VIRUS INFECTION: ICD-10-CM

## 2022-08-23 RX ORDER — ALBUTEROL SULFATE 90 UG/1
2 AEROSOL, METERED RESPIRATORY (INHALATION) EVERY 4 HOURS PRN
Qty: 18 G | Refills: 0 | Status: SHIPPED | OUTPATIENT
Start: 2022-08-23

## 2022-08-23 NOTE — TELEPHONE ENCOUNTER
Notify patient that I have sent an albuterol inhaler to pharmacy.  If she having any chest pain, difficulty breathing question or wheezing with her shortness of breath?.  Diagnosed with COVID last week.

## 2022-08-23 NOTE — TELEPHONE ENCOUNTER
Pt notified and stated she is not having worsening symptoms she said he had a little SOA yesterday but is fine.

## 2022-08-24 NOTE — TELEPHONE ENCOUNTER
Her update her status on Friday.  If no improvement, may require imaging.  Have her continue the inhaler that was sent to pharmacy .

## 2022-10-12 ENCOUNTER — OFFICE VISIT (OUTPATIENT)
Dept: FAMILY MEDICINE CLINIC | Facility: CLINIC | Age: 77
End: 2022-10-12

## 2022-10-12 VITALS
BODY MASS INDEX: 33.19 KG/M2 | WEIGHT: 219 LBS | SYSTOLIC BLOOD PRESSURE: 140 MMHG | RESPIRATION RATE: 15 BRPM | HEIGHT: 68 IN | HEART RATE: 76 BPM | TEMPERATURE: 97.5 F | OXYGEN SATURATION: 99 % | DIASTOLIC BLOOD PRESSURE: 82 MMHG

## 2022-10-12 DIAGNOSIS — Z00.00 MEDICARE ANNUAL WELLNESS VISIT, SUBSEQUENT: Primary | ICD-10-CM

## 2022-10-12 DIAGNOSIS — E78.00 HYPERCHOLESTEROLEMIA: ICD-10-CM

## 2022-10-12 DIAGNOSIS — I10 ESSENTIAL HYPERTENSION: ICD-10-CM

## 2022-10-12 DIAGNOSIS — Z23 NEED FOR INFLUENZA VACCINATION: ICD-10-CM

## 2022-10-12 PROCEDURE — 1170F FXNL STATUS ASSESSED: CPT | Performed by: PHYSICIAN ASSISTANT

## 2022-10-12 PROCEDURE — G0439 PPPS, SUBSEQ VISIT: HCPCS | Performed by: PHYSICIAN ASSISTANT

## 2022-10-12 PROCEDURE — 90662 IIV NO PRSV INCREASED AG IM: CPT | Performed by: PHYSICIAN ASSISTANT

## 2022-10-12 PROCEDURE — 1160F RVW MEDS BY RX/DR IN RCRD: CPT | Performed by: PHYSICIAN ASSISTANT

## 2022-10-12 PROCEDURE — G0008 ADMIN INFLUENZA VIRUS VAC: HCPCS | Performed by: PHYSICIAN ASSISTANT

## 2022-10-12 PROCEDURE — 96160 PT-FOCUSED HLTH RISK ASSMT: CPT | Performed by: PHYSICIAN ASSISTANT

## 2022-10-12 PROCEDURE — 1126F AMNT PAIN NOTED NONE PRSNT: CPT | Performed by: PHYSICIAN ASSISTANT

## 2022-10-12 NOTE — PROGRESS NOTES
The ABCs of the Annual Wellness Visit  Subsequent Medicare Wellness Visit    Chief Complaint   Patient presents with   • Medicare Wellness-subsequent   • Hypertension     management   • Hyperlipidemia     management      Subjective    History of Present Illness:  Sanjana Morse is a 77 y.o. female who presents for a Subsequent Medicare Wellness Visit, hypertension and hyperlipidemia management.  She has lost 5 pounds since office visit in August 2022.  Sanjana states her  was recently diagnosed with colon cancer stage IV approximately 2 weeks ago.  He is undergoing chemotherapy currently.  Will have PET scan today.  Her diet has been slightly healthy.  Sleep has been normal.  States she has been on an emotional roller coaster with her 's recent diagnosis.  He has a history of prostate cancer.  States she is handling the news okay for now.  Has good support with kids.  Took a decongestant this morning for postnasal drip.  States it is a very low dose decongestant.  She had spoken with the pharmacist about this in past and he said it was okay.  Bowel movements have been normal without dark black tarry stools.  On he denied any current fevers, chills, cough, wheezing, shortness of air, headache, dizziness, sore throat, nausea, vomiting, diarrhea, urinary symptoms or swelling of ankles.      The following portions of the patient's history were reviewed and   updated as appropriate:   She  has a past medical history of Asthma (July, 2019), Cancer (HCC) (?), Cataract (2017), Diverticulosis (Colonoscopy showed this.), GERD (gastroesophageal reflux disease) (Four years or more), Hypertension (15 years or so?), Pneumonia (July 2019), Renal insufficiency, and Urinary tract infection (Couple last two years).  She does not have any pertinent problems on file.  She  has a past surgical history that includes Knee arthroscopy (Left, 03/05/2019); Appendectomy (1986); Cholecystectomy (1977); Eye surgery (2017);  Hysterectomy (1986); and Colonoscopy (Sept., 2020).  Her family history includes Early death in her brother.  She  reports that she has quit smoking. Her smoking use included cigarettes and cigarettes. She has a 24.00 pack-year smoking history. She has never used smokeless tobacco. She reports current alcohol use. She reports that she does not use drugs.  Current Outpatient Medications   Medication Sig Dispense Refill   • albuterol sulfate  (90 Base) MCG/ACT inhaler Inhale 2 puffs Every 4 (Four) Hours As Needed for Wheezing or Shortness of Air. 18 g 0   • BIOTIN PO Take  by mouth.     • Calcium Carb-Cholecalciferol (CALCIUM 1000 + D PO) Take  by mouth.     • Cholecalciferol (VITAMIN D3 PO) Take  by mouth.     • lisinopril (PRINIVIL,ZESTRIL) 5 MG tablet TAKE ONE TABLET BY MOUTH DAILY 90 tablet 0   • Multiple Vitamins-Minerals (PRESERVISION AREDS 2 PO) Take  by mouth.     • Specialty Vitamins Products (MENOPAUSE SUPPORT PO) Take  by mouth.     • valACYclovir (Valtrex) 1000 MG tablet Take 2 tablets twice a day as needed for fever blisters 8 tablet 0   • vitamin E 100 UNIT capsule Take 100 Units by mouth Daily.       No current facility-administered medications for this visit.     Current Outpatient Medications on File Prior to Visit   Medication Sig   • albuterol sulfate  (90 Base) MCG/ACT inhaler Inhale 2 puffs Every 4 (Four) Hours As Needed for Wheezing or Shortness of Air.   • BIOTIN PO Take  by mouth.   • Calcium Carb-Cholecalciferol (CALCIUM 1000 + D PO) Take  by mouth.   • Cholecalciferol (VITAMIN D3 PO) Take  by mouth.   • lisinopril (PRINIVIL,ZESTRIL) 5 MG tablet TAKE ONE TABLET BY MOUTH DAILY   • Multiple Vitamins-Minerals (PRESERVISION AREDS 2 PO) Take  by mouth.   • Specialty Vitamins Products (MENOPAUSE SUPPORT PO) Take  by mouth.   • valACYclovir (Valtrex) 1000 MG tablet Take 2 tablets twice a day as needed for fever blisters   • vitamin E 100 UNIT capsule Take 100 Units by mouth Daily.      No current facility-administered medications on file prior to visit.     She is allergic to hydrocodone..    Compared to one year ago, the patient feels her physical   health is the same.    Compared to one year ago, the patient feels her mental   health is worse.    Recent Hospitalizations:  She was not admitted to the hospital during the last year.       Current Medical Providers:  Patient Care Team:  Reyna Singh PA-C as PCP - Vicente Meza MD as Consulting Physician (Pulmonary Disease)    Outpatient Medications Prior to Visit   Medication Sig Dispense Refill   • albuterol sulfate  (90 Base) MCG/ACT inhaler Inhale 2 puffs Every 4 (Four) Hours As Needed for Wheezing or Shortness of Air. 18 g 0   • BIOTIN PO Take  by mouth.     • Calcium Carb-Cholecalciferol (CALCIUM 1000 + D PO) Take  by mouth.     • Cholecalciferol (VITAMIN D3 PO) Take  by mouth.     • lisinopril (PRINIVIL,ZESTRIL) 5 MG tablet TAKE ONE TABLET BY MOUTH DAILY 90 tablet 0   • Multiple Vitamins-Minerals (PRESERVISION AREDS 2 PO) Take  by mouth.     • Specialty Vitamins Products (MENOPAUSE SUPPORT PO) Take  by mouth.     • valACYclovir (Valtrex) 1000 MG tablet Take 2 tablets twice a day as needed for fever blisters 8 tablet 0   • vitamin E 100 UNIT capsule Take 100 Units by mouth Daily.       No facility-administered medications prior to visit.       No opioid medication identified on active medication list. I have reviewed chart for other potential  high risk medication/s and harmful drug interactions in the elderly.          Aspirin is not on active medication list.  Aspirin use is not indicated based on review of current medical condition/s. Risk of harm outweighs potential benefits.  .    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   • Derangement of posterior horn of medial meniscus   • Essential hypertension   • Osteoarthritis   •  "Postmenopausal status   • Medicare annual wellness visit, subsequent   • Postmenopause   • Need for pneumococcal vaccination   • Hypercholesterolemia   • Sinus headache   • Screening mammogram, encounter for   • Degenerative tear of posterior horn of medial meniscus of left knee   • Wheezing   • Cough   • Lung nodule < 6cm on CT   • COPD exacerbation (HCC)   • Obesity (BMI 30-39.9)   • Gastroesophageal reflux disease   • Colon cancer screening   • Urinary tract infection without hematuria     Advance Care Planning  Advance Directive is not on file.  ACP discussion was held with the patient during this visit. Patient does not have an advance directive, information provided.    Review of Systems   Constitutional: Negative.    HENT: Positive for postnasal drip.    Respiratory: Negative.    Cardiovascular: Negative.    Gastrointestinal: Negative.    Genitourinary: Negative.    Musculoskeletal: Negative.    Allergic/Immunologic: Negative.    Neurological: Negative.    Psychiatric/Behavioral: Negative for suicidal ideas.        Emotional due to 's recent stage IV colon cancer   All other systems reviewed and are negative.       Objective    Vitals:    10/12/22 1000 10/12/22 1035   BP: 150/92 140/82   BP Location:  Left arm   Patient Position:  Sitting   Cuff Size:  Adult   Pulse: 76    Resp: 15    Temp: 97.5 °F (36.4 °C)    SpO2: 99%    Weight: 99.3 kg (219 lb)    Height: 172.7 cm (67.99\")    PainSc: 0-No pain      Estimated body mass index is 33.31 kg/m² as calculated from the following:    Height as of this encounter: 172.7 cm (67.99\").    Weight as of this encounter: 99.3 kg (219 lb).    BMI is >= 30 and <35. (Class 1 Obesity). The following options were offered after discussion;: weight loss educational material (shared in after visit summary) and exercise counseling/recommendations      Does the patient have evidence of cognitive impairment? No    Physical Exam  Vitals and nursing note reviewed. "   Constitutional:       Appearance: Normal appearance. She is well-developed and well-groomed. She is obese.      Interventions: Face mask in place.   HENT:      Head: Normocephalic and atraumatic.      Jaw: There is normal jaw occlusion.      Right Ear: Hearing, tympanic membrane, ear canal and external ear normal.      Left Ear: Hearing, tympanic membrane, ear canal and external ear normal.      Nose: Nose normal.      Right Sinus: No maxillary sinus tenderness or frontal sinus tenderness.      Left Sinus: No maxillary sinus tenderness or frontal sinus tenderness.      Mouth/Throat:      Lips: Pink.      Mouth: Mucous membranes are moist.      Dentition: Normal dentition.      Tongue: No lesions.      Pharynx: Oropharynx is clear. Uvula midline.      Tonsils: No tonsillar exudate.   Eyes:      General: Lids are normal.      Conjunctiva/sclera: Conjunctivae normal.      Pupils: Pupils are equal, round, and reactive to light.   Neck:      Thyroid: No thyroid mass, thyromegaly or thyroid tenderness.      Vascular: No carotid bruit.      Trachea: Trachea and phonation normal. No tracheal tenderness.   Cardiovascular:      Rate and Rhythm: Normal rate and regular rhythm.      Pulses: Normal pulses.      Heart sounds: Normal heart sounds, S1 normal and S2 normal. No murmur heard.  Pulmonary:      Effort: Pulmonary effort is normal.      Breath sounds: Normal breath sounds and air entry.   Abdominal:      General: Bowel sounds are normal.      Palpations: Abdomen is soft.      Tenderness: There is no abdominal tenderness. There is no right CVA tenderness, left CVA tenderness, guarding or rebound. Negative signs include Knox's sign, Rovsing's sign, McBurney's sign, psoas sign and obturator sign.   Musculoskeletal:      Cervical back: Neck supple.      Right lower leg: No edema.      Left lower leg: No edema.   Lymphadenopathy:      Cervical: No cervical adenopathy.      Right cervical: No superficial, deep or posterior  cervical adenopathy.     Left cervical: No superficial, deep or posterior cervical adenopathy.   Skin:     General: Skin is warm and dry.      Capillary Refill: Capillary refill takes less than 2 seconds.   Neurological:      Mental Status: She is alert and oriented to person, place, and time.      Deep Tendon Reflexes:      Reflex Scores:       Patellar reflexes are 2+ on the right side and 2+ on the left side.  Psychiatric:         Attention and Perception: Attention and perception normal.         Mood and Affect: Mood normal. Affect is tearful.         Speech: Speech normal.         Behavior: Behavior is cooperative.         Thought Content: Thought content normal.         Cognition and Memory: Cognition and memory normal.         Judgment: Judgment normal.     I wore a facial mask during this patient encounter.  Patient also wearing a surgical mask.  Hand hygiene performed before and after seeing the patient.    Lab Results   Component Value Date    CHLPL 201 (H) 10/05/2022    TRIG 115 10/05/2022    HDL 56 10/05/2022     (H) 10/05/2022    VLDL 20 10/05/2022            HEALTH RISK ASSESSMENT    Smoking Status:  Social History     Tobacco Use   Smoking Status Former   • Packs/day: 1.00   • Years: 24.00   • Pack years: 24.00   • Types: Cigarettes   Smokeless Tobacco Never   Tobacco Comments    Not smoked for 33 years. Quit  1988.     Alcohol Consumption:  Social History     Substance and Sexual Activity   Alcohol Use Yes     Fall Risk Screen:    STEADI Fall Risk Assessment was completed, and patient is at LOW risk for falls.Assessment completed on:10/12/2022    Depression Screening:  PHQ-2/PHQ-9 Depression Screening 10/12/2022   Retired PHQ-9 Total Score -   Retired Total Score -   Little Interest or Pleasure in Doing Things 0-->not at all   Feeling Down, Depressed or Hopeless 1-->several days   PHQ-9: Brief Depression Severity Measure Score 1       Health Habits and Functional and Cognitive  Screening:  Functional & Cognitive Status 10/12/2022   Do you have difficulty preparing food and eating? No   Do you have difficulty bathing yourself, getting dressed or grooming yourself? No   Do you have difficulty using the toilet? No   Do you have difficulty moving around from place to place? No   Do you have trouble with steps or getting out of a bed or a chair? No   Current Diet Well Balanced Diet   Dental Exam Up to date   Eye Exam Up to date   Exercise (times per week) 6 times per week   Current Exercises Include Walking   Current Exercise Activities Include -   Do you need help using the phone?  No   Are you deaf or do you have serious difficulty hearing?  No   Do you need help with transportation? No   Do you need help shopping? No   Do you need help preparing meals?  No   Do you need help with housework?  No   Do you need help with laundry? No   Do you need help taking your medications? No   Do you need help managing money? No   Do you ever drive or ride in a car without wearing a seat belt? No   Have you felt unusual stress, anger or loneliness in the last month? Yes   Who do you live with? Spouse   If you need help, do you have trouble finding someone available to you? No   Have you been bothered in the last four weeks by sexual problems? No   Do you have difficulty concentrating, remembering or making decisions? No       Age-appropriate Screening Schedule:  Refer to the list below for future screening recommendations based on patient's age, sex and/or medical conditions. Orders for these recommended tests are listed in the plan section. The patient has been provided with a written plan.    Health Maintenance   Topic Date Due   • DXA SCAN  10/12/2023 (Originally 9/3/2021)   • LIPID PANEL  10/05/2023   • MAMMOGRAM  02/23/2024   • TDAP/TD VACCINES (2 - Td or Tdap) 11/28/2026   • INFLUENZA VACCINE  Completed   • ZOSTER VACCINE  Completed              Assessment & Plan   CMS Preventative Services Quick  Reference  Risk Factors Identified During Encounter  Cardiovascular Disease  The above risks/problems have been discussed with the patient.  Follow up actions/plans if indicated are seen below in the Assessment/Plan Section.  Pertinent information has been shared with the patient in the After Visit Summary.    Diagnoses and all orders for this visit:    1. Medicare annual wellness visit, subsequent (Primary)    2. Essential hypertension    3. Hypercholesterolemia    4. Need for influenza vaccination  -     Fluzone High-Dose 65+yrs (7963-3404)    1.  Annual [employer] Medicare wellness exam with hypercholesteremia: I have reviewed above blood work with her today.  Stressed the importance of decreasing her fatty food and carbohydrates in diet.  We will reevaluate with fasting labs in 6 months.  2.  Chronic and stable hypertension: Have rechecked blood pressure today and got 140/82 in left arm.  She will continue current medication at home as directed.  I suspect her blood pressure is up slightly due to taking the decongestant this morning.  She will use this sparingly.  She voiced understanding.  3.  Need for influenza vaccination: Sanjana has given written consent to receive updated dose flu shot today.    Follow Up:   Return in about 6 months (around 4/12/2023), or HTn cholesterolemia labs prior.     An After Visit Summary and PPPS were made available to the patient.                 Patient Counseling:  --Nutrition: Stressed importance of moderation in sodium/caffeine intake, saturated fat and cholesterol.  Discussed caloric balance, sufficient intake of fresh fruits, vegetables, fiber,   calcium, iron.  --Discussed the new recommendation against daily use of baby aspirin for primary prevention in low risk patients.  --Exercise: Stressed the importance of regular exercise by incorporating into daily routine.    --Substance Abuse: Discussed cessation/primary prevention of tobacco, alcohol, or other drug use; driving or  other dangerous activities under the influence.    --Dental health: Discussed importance of regular tooth brushing, flossing, and dental visits.  -- Suggested having eyes and vision checked if needed or past due.  --Immunizations reviewed.  --Discussed benefits of screening colonoscopy.    BRENDA Garcia [facility]  [address] [place] [id number]  Dept: [phone]  Dept Fax: [phone]  Loc: [phone]  Loc Fax: [phone]

## 2022-11-03 DIAGNOSIS — I10 ESSENTIAL HYPERTENSION: ICD-10-CM

## 2022-11-03 RX ORDER — LISINOPRIL 5 MG/1
TABLET ORAL
Qty: 90 TABLET | Refills: 2 | Status: SHIPPED | OUTPATIENT
Start: 2022-11-03

## 2023-01-25 ENCOUNTER — TELEPHONE (OUTPATIENT)
Dept: FAMILY MEDICINE CLINIC | Facility: CLINIC | Age: 78
End: 2023-01-25

## 2023-01-25 ENCOUNTER — OFFICE VISIT (OUTPATIENT)
Dept: FAMILY MEDICINE CLINIC | Facility: CLINIC | Age: 78
End: 2023-01-25
Payer: MEDICARE

## 2023-01-25 VITALS
TEMPERATURE: 97.8 F | WEIGHT: 222 LBS | OXYGEN SATURATION: 97 % | HEART RATE: 69 BPM | BODY MASS INDEX: 33.65 KG/M2 | SYSTOLIC BLOOD PRESSURE: 130 MMHG | RESPIRATION RATE: 15 BRPM | DIASTOLIC BLOOD PRESSURE: 80 MMHG | HEIGHT: 68 IN

## 2023-01-25 DIAGNOSIS — H65.01 NON-RECURRENT ACUTE SEROUS OTITIS MEDIA OF RIGHT EAR: ICD-10-CM

## 2023-01-25 DIAGNOSIS — H61.23 BILATERAL IMPACTED CERUMEN: ICD-10-CM

## 2023-01-25 DIAGNOSIS — H81.13 BENIGN PAROXYSMAL POSITIONAL VERTIGO DUE TO BILATERAL VESTIBULAR DISORDER: Primary | ICD-10-CM

## 2023-01-25 PROCEDURE — 69209 REMOVE IMPACTED EAR WAX UNI: CPT | Performed by: PHYSICIAN ASSISTANT

## 2023-01-25 PROCEDURE — 99213 OFFICE O/P EST LOW 20 MIN: CPT | Performed by: PHYSICIAN ASSISTANT

## 2023-01-25 RX ORDER — AMOXICILLIN AND CLAVULANATE POTASSIUM 875; 125 MG/1; MG/1
1 TABLET, FILM COATED ORAL 2 TIMES DAILY
Qty: 14 TABLET | Refills: 0 | Status: SHIPPED | OUTPATIENT
Start: 2023-01-25

## 2023-01-25 RX ORDER — MECLIZINE HYDROCHLORIDE 25 MG/1
25 TABLET ORAL 3 TIMES DAILY PRN
Qty: 30 TABLET | Refills: 0 | Status: SHIPPED | OUTPATIENT
Start: 2023-01-25

## 2023-01-25 NOTE — PROGRESS NOTES
"Chief Complaint  Dizziness (X1 day)    Subjective          Sanjana Morse presents to [facility]  History of Present Illness  Sanjana is a 77-year-old female who presents with dizziness that started this morning.  States she got up to go to the bathroom early this morning and felt the room spinning.  States she has noticed the room spinning with certain head movements.  She has had some nasal congestion with postnasal drip and clear rhinorrhea off and on for several weeks.  Denied any current fevers, chills, headache, ear pain, sore throat, cough, wheezing, shortness of breath, chest pain or swelling of ankles.  Has not used any over-the-counter medications.  Appetite and sleep have been normal.  Review of Systems   Constitutional: Negative.    HENT: Positive for congestion.    Eyes: Negative.    Respiratory: Negative.    Cardiovascular: Negative.    Gastrointestinal: Negative.    Endocrine: Negative.    Genitourinary: Negative.    Musculoskeletal: Negative.    Skin: Negative.    Allergic/Immunologic: Negative.    Neurological: Positive for dizziness and light-headedness.   Hematological: Negative.    Psychiatric/Behavioral: Negative.    All other systems reviewed and are negative.    Objective   Vital Signs:   /80 (BP Location: Left arm, Patient Position: Sitting)   Pulse 69   Temp 97.8 °F (36.6 °C)   Resp 15   Ht 172.7 cm (67.99\")   Wt 101 kg (222 lb)   SpO2 97%   BMI 33.76 kg/m²     Physical Exam  Vitals and nursing note reviewed.   Constitutional:       Appearance: Normal appearance. She is well-developed and well-groomed. She is obese.      Interventions: Face mask in place.   HENT:      Head: Normocephalic and atraumatic.      Jaw: There is normal jaw occlusion.      Right Ear: Hearing, ear canal and external ear normal. There is impacted cerumen. Tympanic membrane is erythematous.      Left Ear: Hearing, tympanic membrane, ear canal and external ear normal. There " is impacted cerumen.      Ears:      Comments: Bilateral cerumen impaction noted.  After ear lavage right TM was erythematous.       Nose: Nose normal.      Right Sinus: No maxillary sinus tenderness or frontal sinus tenderness.      Left Sinus: No maxillary sinus tenderness or frontal sinus tenderness.      Mouth/Throat:      Lips: Pink.      Mouth: Mucous membranes are moist.      Dentition: Normal dentition.      Tongue: No lesions.      Pharynx: Oropharynx is clear. Uvula midline.      Tonsils: No tonsillar exudate.   Eyes:      General: Lids are normal.      Conjunctiva/sclera: Conjunctivae normal.      Pupils: Pupils are equal, round, and reactive to light.   Neck:      Vascular: No carotid bruit.      Trachea: Trachea and phonation normal. No tracheal tenderness.   Cardiovascular:      Rate and Rhythm: Normal rate and regular rhythm.      Pulses: Normal pulses.      Heart sounds: Normal heart sounds, S1 normal and S2 normal. No murmur heard.  Pulmonary:      Effort: Pulmonary effort is normal.      Breath sounds: Normal breath sounds and air entry.   Abdominal:      General: Bowel sounds are normal.      Palpations: Abdomen is soft.      Tenderness: There is no abdominal tenderness. There is no right CVA tenderness, left CVA tenderness, guarding or rebound. Negative signs include Knox's sign, Rovsing's sign, McBurney's sign, psoas sign and obturator sign.   Musculoskeletal:      Cervical back: Neck supple.      Right lower leg: No edema.      Left lower leg: No edema.   Lymphadenopathy:      Cervical: No cervical adenopathy.      Right cervical: No superficial, deep or posterior cervical adenopathy.     Left cervical: No superficial, deep or posterior cervical adenopathy.   Skin:     General: Skin is warm and dry.      Capillary Refill: Capillary refill takes less than 2 seconds.   Neurological:      Mental Status: She is alert and oriented to person, place, and time.   Psychiatric:         Attention and  Perception: Attention and perception normal.         Mood and Affect: Mood and affect normal.         Speech: Speech normal.         Behavior: Behavior is cooperative.         Thought Content: Thought content normal.         Cognition and Memory: Cognition and memory normal.         Judgment: Judgment normal.     I wore a facial mask during this patient encounter.  Patient also wearing a surgical mask.  Hand hygiene performed before and after seeing the patient.     Result Review :          Ear Cerumen Removal    Date/Time: 1/25/2023 4:00 PM  Performed by: Reyna Singh PA-C  Authorized by: Reyna Singh PA-C   Consent: Verbal consent obtained. Written consent not obtained.  Risks and benefits: risks, benefits and alternatives were discussed  Consent given by: patient  Patient understanding: patient states understanding of the procedure being performed  Patient consent: the patient's understanding of the procedure matches consent given  Procedure consent: procedure consent matches procedure scheduled  Relevant documents: relevant documents not present or verified  Test results: test results not available  Site marked: the operative site was marked  Imaging studies: imaging studies not available  Patient identity confirmed: verbally with patient    Anesthesia:  Local Anesthetic: none  Location details: right ear and left ear  Patient tolerance: patient tolerated the procedure well with no immediate complications  Comments: Bilateral ear lavage was performed by Latesha Nunez MA.  I have examined ears after ear lavage.  Had total resolution of cerumen impaction.  Instructed not to use Q-tips  Procedure type: irrigation   Sedation:  Patient sedated: no              Assessment and Plan    Diagnoses and all orders for this visit:    1. Benign paroxysmal positional vertigo due to bilateral vestibular disorder (Primary)  -     meclizine (ANTIVERT) 25 MG tablet; Take 1 tablet by mouth 3 (Three) Times a Day As  Needed for Dizziness.  Dispense: 30 tablet; Refill: 0    2. Bilateral impacted cerumen  -     Cerumen Removal    3. Non-recurrent acute serous otitis media of right ear  -     amoxicillin-clavulanate (Augmentin) 875-125 MG per tablet; Take 1 tablet by mouth 2 (Two) Times a Day.  Dispense: 14 tablet; Refill: 0    1.  New benign positional vertigo: I have sent Antivert medication to pharmacy for her to take as directed.  We will follow-up if no improvement.  I suspect this is the source of her dizziness.  Have given Epley maneuvers to do at home.  2.  New bilateral impacted cerumen with new right otitis media: Ear lavage was performed in office today with total resolution of cerumen.  Noted to have right ear infection.  Have sent Augmentin antibiotic to pharmacy.    Follow Up   Return if symptoms worsen or fail to improve.  Patient was given instructions and counseling regarding her condition or for health maintenance advice. Please see specific information pulled into the AVS if appropriate.     BRENDA Garcia [facility]  [address] [place] [id number]  Dept: [phone]  Dept Fax: [phone]  Loc: [phone]  Loc Fax: [phone]        Answers for HPI/ROS submitted by the patient on 1/25/2023  Please describe your symptoms.: Routine yearly visit, Dizziness when lying down on right side, when bending head back, forward, or to right side.  Have you had these symptoms before?: No  How long have you been having these symptoms?: 1-4 days  Please list any medications you are currently taking for this condition.: Took a decongestant.  Please describe any probable cause for these symptoms. : Maybe congestion.  Have had runny nose, and congestion at times lately.  What is the primary reason for your visit?: Other

## 2023-01-25 NOTE — TELEPHONE ENCOUNTER
Caller: Sanjana Morse     Best call back number: [phone]    What is your medical concern? PATIENT IS CALLING BECAUSE SHE WOULD LIKE TO SPEAK WITH RAOUL ABOUT HER DIZZINESS THAT SHE HAS BEEN EXPERIENCING     How long has this issue been going on? PATIENT STATES THAT SHE HAS ONLY NOTICED IT THIS MORNING    Is your provider already aware of this issue? NO    Have you been treated for this issue? NO

## 2023-02-21 ENCOUNTER — TELEPHONE (OUTPATIENT)
Dept: FAMILY MEDICINE CLINIC | Facility: CLINIC | Age: 78
End: 2023-02-21

## 2023-02-21 NOTE — TELEPHONE ENCOUNTER
Caller: [facility]NA[facility]    Relationship: Other    Best call back number: [phone] FAX:[phone]    What orders are you requesting (i.e. lab or imaging): MAMMOGRAM     In what timeframe would the patient need to come in: 02/24/23    Where will you receive your lab/imaging services: [facility]    Additional notes: PLEASE ADVISE

## 2023-02-22 DIAGNOSIS — Z12.31 SCREENING MAMMOGRAM FOR BREAST CANCER: Primary | ICD-10-CM

## 2023-02-22 NOTE — TELEPHONE ENCOUNTER
Notify patient that I have placed the referral for mammogram for [facility].

## 2023-04-10 DIAGNOSIS — E78.00 HYPERCHOLESTEROLEMIA: ICD-10-CM

## 2023-04-10 DIAGNOSIS — I10 ESSENTIAL HYPERTENSION: Primary | ICD-10-CM

## 2023-04-13 LAB
ALBUMIN SERPL-MCNC: 4.2 G/DL (ref 3.5–5.2)
ALBUMIN/GLOB SERPL: 2 G/DL
ALP SERPL-CCNC: 66 U/L (ref 39–117)
ALT SERPL-CCNC: 22 U/L (ref 1–33)
AST SERPL-CCNC: 20 U/L (ref 1–32)
BASOPHILS # BLD AUTO: 0.04 10*3/MM3 (ref 0–0.2)
BASOPHILS NFR BLD AUTO: 0.7 % (ref 0–1.5)
BILIRUB SERPL-MCNC: 0.4 MG/DL (ref 0–1.2)
BUN SERPL-MCNC: 24 MG/DL (ref 8–23)
BUN/CREAT SERPL: 28.9 (ref 7–25)
CALCIUM SERPL-MCNC: 9.8 MG/DL (ref 8.6–10.5)
CHLORIDE SERPL-SCNC: 104 MMOL/L (ref 98–107)
CHOLEST SERPL-MCNC: 192 MG/DL (ref 0–200)
CO2 SERPL-SCNC: 23.5 MMOL/L (ref 22–29)
CREAT SERPL-MCNC: 0.83 MG/DL (ref 0.57–1)
EGFRCR SERPLBLD CKD-EPI 2021: 71.8 ML/MIN/1.73
EOSINOPHIL # BLD AUTO: 0.12 10*3/MM3 (ref 0–0.4)
EOSINOPHIL NFR BLD AUTO: 2 % (ref 0.3–6.2)
ERYTHROCYTE [DISTWIDTH] IN BLOOD BY AUTOMATED COUNT: 11.5 % (ref 12.3–15.4)
GLOBULIN SER CALC-MCNC: 2.1 GM/DL
GLUCOSE SERPL-MCNC: 107 MG/DL (ref 65–99)
HCT VFR BLD AUTO: 37.4 % (ref 34–46.6)
HDLC SERPL-MCNC: 59 MG/DL (ref 40–60)
HGB BLD-MCNC: 13.3 G/DL (ref 12–15.9)
IMM GRANULOCYTES # BLD AUTO: 0.02 10*3/MM3 (ref 0–0.05)
IMM GRANULOCYTES NFR BLD AUTO: 0.3 % (ref 0–0.5)
LDLC SERPL CALC-MCNC: 116 MG/DL (ref 0–100)
LYMPHOCYTES # BLD AUTO: 2.45 10*3/MM3 (ref 0.7–3.1)
LYMPHOCYTES NFR BLD AUTO: 40.2 % (ref 19.6–45.3)
MCH RBC QN AUTO: 31.7 PG (ref 26.6–33)
MCHC RBC AUTO-ENTMCNC: 35.6 G/DL (ref 31.5–35.7)
MCV RBC AUTO: 89.3 FL (ref 79–97)
MONOCYTES # BLD AUTO: 0.53 10*3/MM3 (ref 0.1–0.9)
MONOCYTES NFR BLD AUTO: 8.7 % (ref 5–12)
NEUTROPHILS # BLD AUTO: 2.93 10*3/MM3 (ref 1.7–7)
NEUTROPHILS NFR BLD AUTO: 48.1 % (ref 42.7–76)
NRBC BLD AUTO-RTO: 0 /100 WBC (ref 0–0.2)
PLATELET # BLD AUTO: 221 10*3/MM3 (ref 140–450)
POTASSIUM SERPL-SCNC: 4.2 MMOL/L (ref 3.5–5.2)
PROT SERPL-MCNC: 6.3 G/DL (ref 6–8.5)
RBC # BLD AUTO: 4.19 10*6/MM3 (ref 3.77–5.28)
SODIUM SERPL-SCNC: 141 MMOL/L (ref 136–145)
TRIGL SERPL-MCNC: 92 MG/DL (ref 0–150)
VLDLC SERPL CALC-MCNC: 17 MG/DL (ref 5–40)
WBC # BLD AUTO: 6.09 10*3/MM3 (ref 3.4–10.8)

## 2023-04-18 PROBLEM — N39.0 URINARY TRACT INFECTION WITHOUT HEMATURIA: Status: RESOLVED | Noted: 2020-04-13 | Resolved: 2023-04-18

## 2023-04-18 PROBLEM — R05.9 COUGH: Status: RESOLVED | Noted: 2019-07-30 | Resolved: 2023-04-18

## 2023-04-18 PROBLEM — R06.2 WHEEZING: Status: RESOLVED | Noted: 2019-07-30 | Resolved: 2023-04-18

## 2023-04-19 ENCOUNTER — OFFICE VISIT (OUTPATIENT)
Dept: FAMILY MEDICINE CLINIC | Facility: CLINIC | Age: 78
End: 2023-04-19
Payer: MEDICARE

## 2023-04-19 ENCOUNTER — HOSPITAL ENCOUNTER (OUTPATIENT)
Dept: GENERAL RADIOLOGY | Facility: HOSPITAL | Age: 78
Discharge: HOME | End: 2023-04-19
Admitting: PHYSICIAN ASSISTANT
Payer: MEDICARE

## 2023-04-19 VITALS
WEIGHT: 223 LBS | TEMPERATURE: 98.2 F | BODY MASS INDEX: 33.8 KG/M2 | OXYGEN SATURATION: 99 % | HEIGHT: 68 IN | RESPIRATION RATE: 16 BRPM | SYSTOLIC BLOOD PRESSURE: 160 MMHG | DIASTOLIC BLOOD PRESSURE: 76 MMHG | HEART RATE: 74 BPM

## 2023-04-19 DIAGNOSIS — S89.91XA INJURY OF RIGHT LOWER EXTREMITY, INITIAL ENCOUNTER: ICD-10-CM

## 2023-04-19 DIAGNOSIS — M79.604 RIGHT LEG PAIN: ICD-10-CM

## 2023-04-19 DIAGNOSIS — I10 ESSENTIAL HYPERTENSION: ICD-10-CM

## 2023-04-19 DIAGNOSIS — E78.00 HYPERCHOLESTEROLEMIA: Primary | ICD-10-CM

## 2023-04-19 PROCEDURE — 73590 X-RAY EXAM OF LOWER LEG: CPT

## 2023-04-19 RX ORDER — LISINOPRIL 10 MG/1
10 TABLET ORAL DAILY
Qty: 90 TABLET | Refills: 0 | Status: SHIPPED | OUTPATIENT
Start: 2023-04-19

## 2023-04-19 NOTE — PROGRESS NOTES
"Chief Complaint  Hypertension (Management), Pain (Right lower leg injury), and Hyperlipidemia (Management)    Subjective          Sanjana Morse presents to [facility]  History of Present Illness  Sanjana is a 78-year-old female who presents for hypertension and hyperlipidemia management.  She has gained 1 pound since last office visit on January 25, 2023.  Sanjana states she was putting groceries in the back end of her tall home a few days ago.  States she turned in her right lateral aspect of the leg hit the trailer hitch.  States it was very painful.  Since this time she has had pain getting up from a seated position.  States when she walks after a period of time or lays down the pain resolves.  Sanjana has been using over-the-counter ibuprofen and ice with some relief.  Denied any redness or bruising to the area.  Has not noticed any swelling, chest pain, shortness of air, or wheezing.  Her diet has been \"bad\".  Sleep has been normal.  Bowel movements have been daily without dark black tarry stools.  Has been compliant with medication.       Objective   Vital Signs:   /76 (BP Location: Left arm, Patient Position: Sitting, Cuff Size: Adult)   Pulse 74   Temp 98.2 °F (36.8 °C)   Resp 16   Ht 172.7 cm (68\")   Wt 101 kg (223 lb)   SpO2 99%   BMI 33.91 kg/m²     Physical Exam  Vitals and nursing note reviewed.   Constitutional:       Appearance: Normal appearance. She is well-developed and well-groomed. She is obese.      Comments: Walking with a cane   HENT:      Head: Normocephalic and atraumatic.   Neck:      Vascular: No carotid bruit.      Trachea: Trachea and phonation normal. No tracheal tenderness.   Cardiovascular:      Rate and Rhythm: Normal rate and regular rhythm.      Pulses: Normal pulses.      Heart sounds: Normal heart sounds, S1 normal and S2 normal. No murmur heard.  Pulmonary:      Effort: Pulmonary effort is normal.      Breath sounds: Normal breath " sounds and air entry.   Abdominal:      General: Bowel sounds are normal.      Palpations: Abdomen is soft. There is no hepatomegaly.      Tenderness: There is no abdominal tenderness. There is no right CVA tenderness, left CVA tenderness, guarding or rebound. Negative signs include Knox's sign, Rovsing's sign, McBurney's sign, psoas sign and obturator sign.   Musculoskeletal:      Cervical back: Neck supple.      Right lower leg: Tenderness and bony tenderness present. No swelling, deformity or lacerations. No edema.      Left lower leg: Normal.        Legs:    Skin:     General: Skin is warm and dry.      Capillary Refill: Capillary refill takes less than 2 seconds.      Findings: No ecchymosis or erythema.   Neurological:      Mental Status: She is alert and oriented to person, place, and time.   Psychiatric:         Attention and Perception: Attention and perception normal.         Mood and Affect: Mood and affect normal.         Speech: Speech normal.         Behavior: Behavior normal. Behavior is cooperative.         Thought Content: Thought content normal.         Cognition and Memory: Cognition and memory normal.         Judgment: Judgment normal.        Result Review :     CMP        10/5/2022    08:44 4/12/2023    08:30   CMP   Glucose 104   107     BUN 19   24     Creatinine 0.74   0.83     Sodium 139   141     Potassium 4.6   4.2     Chloride 101   104     Calcium 9.7   9.8     Total Protein 6.4   6.3     Albumin 4.40   4.2     Globulin 2.0   2.1     Total Bilirubin 0.3   0.4     Alkaline Phosphatase 66   66     AST (SGOT) 23   20     ALT (SGPT) 24   22     BUN/Creatinine Ratio 25.7   28.9       CBC w/diff        10/5/2022    08:44 4/12/2023    08:30   CBC w/Diff   WBC 6.31   6.09     RBC 4.37   4.19     Hemoglobin 13.7   13.3     Hematocrit 40.0   37.4     MCV 91.5   89.3     MCH 31.4   31.7     MCHC 34.3   35.6     RDW 11.7   11.5     Platelets 237   221     Neutrophil Rel % 45.2   48.1      Lymphocyte Rel % 39.5   40.2     Monocyte Rel % 9.5   8.7     Eosinophil Rel % 4.8   2.0     Basophil Rel % 0.8   0.7       Orders Only on 04/10/2023   Component Date Value Ref Range Status   • WBC 04/12/2023 6.09  3.40 - 10.80 10*3/mm3 Final   • RBC 04/12/2023 4.19  3.77 - 5.28 10*6/mm3 Final   • Hemoglobin 04/12/2023 13.3  12.0 - 15.9 g/dL Final   • Hematocrit 04/12/2023 37.4  34.0 - 46.6 % Final   • MCV 04/12/2023 89.3  79.0 - 97.0 fL Final   • MCH 04/12/2023 31.7  26.6 - 33.0 pg Final   • MCHC 04/12/2023 35.6  31.5 - 35.7 g/dL Final   • RDW 04/12/2023 11.5 (L)  12.3 - 15.4 % Final   • Platelets 04/12/2023 221  140 - 450 10*3/mm3 Final   • Neutrophil Rel % 04/12/2023 48.1  42.7 - 76.0 % Final   • Lymphocyte Rel % 04/12/2023 40.2  19.6 - 45.3 % Final   • Monocyte Rel % 04/12/2023 8.7  5.0 - 12.0 % Final   • Eosinophil Rel % 04/12/2023 2.0  0.3 - 6.2 % Final   • Basophil Rel % 04/12/2023 0.7  0.0 - 1.5 % Final   • Neutrophils Absolute 04/12/2023 2.93  1.70 - 7.00 10*3/mm3 Final   • Lymphocytes Absolute 04/12/2023 2.45  0.70 - 3.10 10*3/mm3 Final   • Monocytes Absolute 04/12/2023 0.53  0.10 - 0.90 10*3/mm3 Final   • Eosinophils Absolute 04/12/2023 0.12  0.00 - 0.40 10*3/mm3 Final   • Basophils Absolute 04/12/2023 0.04  0.00 - 0.20 10*3/mm3 Final   • Immature Granulocyte Rel % 04/12/2023 0.3  0.0 - 0.5 % Final   • Immature Grans Absolute 04/12/2023 0.02  0.00 - 0.05 10*3/mm3 Final   • nRBC 04/12/2023 0.0  0.0 - 0.2 /100 WBC Final   • Glucose 04/12/2023 107 (H)  65 - 99 mg/dL Final   • BUN 04/12/2023 24 (H)  8 - 23 mg/dL Final   • Creatinine 04/12/2023 0.83  0.57 - 1.00 mg/dL Final   • EGFR Result 04/12/2023 71.8  >60.0 mL/min/1.73 Final    Comment: GFR Normal >60  Chronic Kidney Disease <60  Kidney Failure <15  The GFR formula is only valid for adults with stable renal  function between ages 18 and 70.     • BUN/Creatinine Ratio 04/12/2023 28.9 (H)  7.0 - 25.0 Final   • Sodium 04/12/2023 141  136 - 145 mmol/L Final    • Potassium 04/12/2023 4.2  3.5 - 5.2 mmol/L Final   • Chloride 04/12/2023 104  98 - 107 mmol/L Final   • Total CO2 04/12/2023 23.5  22.0 - 29.0 mmol/L Final   • Calcium 04/12/2023 9.8  8.6 - 10.5 mg/dL Final   • Total Protein 04/12/2023 6.3  6.0 - 8.5 g/dL Final   • Albumin 04/12/2023 4.2  3.5 - 5.2 g/dL Final   • Globulin 04/12/2023 2.1  gm/dL Final   • A/G Ratio 04/12/2023 2.0  g/dL Final   • Total Bilirubin 04/12/2023 0.4  0.0 - 1.2 mg/dL Final   • Alkaline Phosphatase 04/12/2023 66  39 - 117 U/L Final   • AST (SGOT) 04/12/2023 20  1 - 32 U/L Final   • ALT (SGPT) 04/12/2023 22  1 - 33 U/L Final   • Total Cholesterol 04/12/2023 192  0 - 200 mg/dL Final    Comment: Cholesterol Reference Ranges  (U.S. Department of Health and Human Services ATP III  Classifications)  Desirable          <200 mg/dL  Borderline High    200-239 mg/dL  High Risk          >240 mg/dL  Triglyceride Reference Ranges  (U.S. Department of Health and Human Services ATP III  Classifications)  Normal           <150 mg/dL  Borderline High  150-199 mg/dL  High             200-499 mg/dL  Very High        >500 mg/dL  HDL Reference Ranges  (U.S. Department of Health and Human Services ATP III  Classifications)  Low     <40 mg/dl (major risk factor for CHD)  High    >60 mg/dl ('negative' risk factor for CHD)  LDL Reference Ranges  (U.S. Department of Health and Human Services ATP III  Classifications)  Optimal          <100 mg/dL  Near Optimal     100-129 mg/dL  Borderline High  130-159 mg/dL  High             160-189 mg/dL  Very High        >189 mg/dL     • Triglycerides 04/12/2023 92  0 - 150 mg/dL Final   • HDL Cholesterol 04/12/2023 59  40 - 60 mg/dL Final   • VLDL Cholesterol Jensen 04/12/2023 17  5 - 40 mg/dL Final   • LDL Chol Calc (NIH) 04/12/2023 116 (H)  0 - 100 mg/dL Final                       Assessment and Plan    Diagnoses and all orders for this visit:    1. Hypercholesterolemia (Primary)    2. Essential hypertension  -     lisinopril  (PRINIVIL,ZESTRIL) 10 MG tablet; Take 1 tablet by mouth Daily.  Dispense: 90 tablet; Refill: 0    3. Right leg pain  -     XR tibia fibula 2 vw right; Future    4. Injury of right lower extremity, initial encounter    1.   Chronic and stable hypercholesteremia: I have reviewed above blood work with her today.  Have stressed importance of decreasing sugar and carbohydrates in diet due to slightly elevated blood sugars.  Cholesterol readings have improved.  Return to office in 6 months for Medicare wellness exam.  2.  Chronic and uncontrolled hypertension: I have rechecked blood pressure today and got 150/84 left arm.  I will increase her lisinopril to 10 mg daily.  Return to office in 1 week for blood pressure check.  3.  New right leg pain status post injury: I will proceed with right tibial/fibula x-ray at [facility] today.  She will be notified of test results when completed.  We will continue using over-the-counter ibuprofen as needed for pain.    I spent 25 minutes caring for Sanjana on this date of service. This time includes time spent by me in the following activities:preparing for the visit, reviewing tests, obtaining and/or reviewing a separately obtained history, performing a medically appropriate examination and/or evaluation , counseling and educating the patient/family/caregiver, ordering medications, tests, or procedures and documenting information in the medical record   Follow Up   Return in about 6 months (around 10/19/2023), or 1 wek for BP check and labs prior to medicare wellness, for Medicare Wellness.  Patient was given instructions and counseling regarding her condition or for health maintenance advice. Please see specific information pulled into the AVS if appropriate.     BRENDA Garcia [facility]  [address] [place] [id number]  Dept: [phone]  Dept Fax: [phone]  Loc: [phone]  Loc Fax:  [phone]        Answers for HPI/ROS submitted by the patient on 4/18/2023  What is the primary reason for your visit?: Physical

## 2023-04-26 ENCOUNTER — TELEPHONE (OUTPATIENT)
Dept: FAMILY MEDICINE CLINIC | Facility: CLINIC | Age: 78
End: 2023-04-26
Payer: MEDICARE

## 2023-04-26 ENCOUNTER — CLINICAL SUPPORT (OUTPATIENT)
Dept: FAMILY MEDICINE CLINIC | Facility: CLINIC | Age: 78
End: 2023-04-26

## 2023-04-26 VITALS — DIASTOLIC BLOOD PRESSURE: 70 MMHG | SYSTOLIC BLOOD PRESSURE: 146 MMHG

## 2023-04-26 DIAGNOSIS — S89.91XA INJURY OF RIGHT LOWER EXTREMITY, INITIAL ENCOUNTER: Primary | ICD-10-CM

## 2023-04-26 DIAGNOSIS — M25.561 ACUTE PAIN OF RIGHT KNEE: ICD-10-CM

## 2023-04-26 NOTE — TELEPHONE ENCOUNTER
Patient here for a Blood Pressure check.  Manual blood pressure in left arm 142/70.    Patient wanted to speak to you about her knee problem that she see you last week for. However I advised that you were seeing patients and that if she was still having problems then she would need to schedule an appointment. She ask if I could give you a message and I said I would.    Patient advised that her knee was some better however it was still giving her problems and wanted to know what the next steps would be.

## 2023-04-26 NOTE — TELEPHONE ENCOUNTER
Spoke with patient, she already has an Ortho that she will call to get appointment to be seen. Can this new referral be cancelled?

## 2023-05-10 DIAGNOSIS — B00.1 HERPES LABIALIS: ICD-10-CM

## 2023-05-10 RX ORDER — VALACYCLOVIR HYDROCHLORIDE 1 G/1
TABLET, FILM COATED ORAL
Qty: 24 TABLET | Refills: 2 | Status: SHIPPED | OUTPATIENT
Start: 2023-05-10

## 2023-08-18 ENCOUNTER — TELEPHONE (OUTPATIENT)
Dept: FAMILY MEDICINE CLINIC | Facility: CLINIC | Age: 78
End: 2023-08-18

## 2023-08-18 ENCOUNTER — OFFICE VISIT (OUTPATIENT)
Dept: FAMILY MEDICINE CLINIC | Facility: CLINIC | Age: 78
End: 2023-08-18
Payer: MEDICARE

## 2023-08-18 VITALS
OXYGEN SATURATION: 99 % | WEIGHT: 218 LBS | DIASTOLIC BLOOD PRESSURE: 82 MMHG | SYSTOLIC BLOOD PRESSURE: 120 MMHG | BODY MASS INDEX: 33.04 KG/M2 | HEART RATE: 62 BPM | HEIGHT: 68 IN | TEMPERATURE: 97.5 F

## 2023-08-18 DIAGNOSIS — N39.0 URINARY TRACT INFECTION WITHOUT HEMATURIA, SITE UNSPECIFIED: Primary | ICD-10-CM

## 2023-08-18 DIAGNOSIS — R39.9 UTI SYMPTOMS: ICD-10-CM

## 2023-08-18 PROCEDURE — 3074F SYST BP LT 130 MM HG: CPT | Performed by: NURSE PRACTITIONER

## 2023-08-18 PROCEDURE — 1160F RVW MEDS BY RX/DR IN RCRD: CPT | Performed by: NURSE PRACTITIONER

## 2023-08-18 PROCEDURE — 1159F MED LIST DOCD IN RCRD: CPT | Performed by: NURSE PRACTITIONER

## 2023-08-18 PROCEDURE — 99213 OFFICE O/P EST LOW 20 MIN: CPT | Performed by: NURSE PRACTITIONER

## 2023-08-18 PROCEDURE — 3079F DIAST BP 80-89 MM HG: CPT | Performed by: NURSE PRACTITIONER

## 2023-08-18 RX ORDER — CIPROFLOXACIN 500 MG/1
500 TABLET, FILM COATED ORAL 2 TIMES DAILY
Qty: 14 TABLET | Refills: 0 | Status: SHIPPED | OUTPATIENT
Start: 2023-08-18 | End: 2023-08-25

## 2023-08-18 NOTE — TELEPHONE ENCOUNTER
Caller: Sanjana Morse    Relationship: Self    Best call back number: [phone]    What medication are you requesting: SOMETHING FOR URGENCY TO URINATE AND FREQUENT URINATION     What are your current symptoms: URGENCY, FREQUENT URINATION     How long have you been experiencing symptoms: 2 DAYS     Have you had these symptoms before:    [x] Yes  [] No    Have you been treated for these symptoms before:   [x] Yes  [] No    If a prescription is needed, what is your preferred pharmacy and phone number:  [facility] [id number] - [facility] [address] AT [facility] [phone] [facility] [phone]      Additional notes: PATIENT HAS BEEN TAKING OVER THE COUNTER MEDICATIONS FOR HER SYMPTOMS AND THEY ARE NOT HELPING.

## 2023-08-18 NOTE — PROGRESS NOTES
"Chief Complaint   Patient presents with    Urinary Frequency     Discomfort x 2 days ago , taking Azo        Urinary Tract Infection: Patient complains of frequency and urgency She has had symptoms for 2 days. Patient also complains of  none . Patient denies back pain and fever. Patient does not have a history of recurrent UTI.  Patient does not have a history of pyelonephritis.     The following portions of the patient's history were reviewed and updated as appropriate: allergies, current medications, past family history, past medical history, past social history, past surgical history and problem list.      Vitals:    08/18/23 1601   BP: 120/82   BP Location: Left arm   Patient Position: Sitting   Cuff Size: Adult   Pulse: 62   Temp: 97.5 øF (36.4 øC)   SpO2: 99%   Weight: 98.9 kg (218 lb)   Height: 172.7 cm (68\")     Gen: Mildly ill appearing, alert  HEENT: WNL  Lung: Regular RR, no audible wheeze  Heart: RR without murmur  Skin: No rash, W/D  Abd: Non tender, non distended, positive bowel sounds  Flank: No CVA, no rash    In office urine dipstick results:  Brief Urine Lab Results       None            Assessment & Plan   Diagnoses and all orders for this visit:    1. Urinary tract infection without hematuria, site unspecified (Primary)  -     ciprofloxacin (Cipro) 500 MG tablet; Take 1 tablet by mouth 2 (Two) Times a Day for 7 days.  Dispense: 14 tablet; Refill: 0    2. UTI symptoms  -     Cancel: POCT urinalysis dipstick, manual - unable to tell with urine dip due to AZO and orange urine.    -     Urine Culture - Urine, Urine, Clean Catch             Tylenol or Advil as needed for pain, fever  Plenty of fluids  OTC Azo ok  Off work or school note given if needed.  Pros and cons of antibiotic use discussed    Laila Head, APRN  Family Practice  [facility] [place]  "

## 2023-08-18 NOTE — PATIENT INSTRUCTIONS
Urinary Tract Infection, Adult  A urinary tract infection (UTI) is an infection of any part of the urinary tract. The urinary tract includes:  The kidneys.  The ureters.  The bladder.  The urethra.  These organs make, store, and get rid of pee (urine) in the body.  What are the causes?  This infection is caused by germs (bacteria) in your genital area. These germs grow and cause swelling (inflammation) of your urinary tract.  What increases the risk?  The following factors may make you more likely to develop this condition:  Using a small, thin tube (catheter) to drain pee.  Not being able to control when you pee or poop (incontinence).  Being female. If you are female, these things can increase the risk:  Using these methods to prevent pregnancy:  A medicine that kills sperm (spermicide).  A device that blocks sperm (diaphragm).  Having low levels of a female hormone (estrogen).  Being pregnant.  You are more likely to develop this condition if:  You have genes that add to your risk.  You are sexually active.  You take antibiotic medicines.  You have trouble peeing because of:  A prostate that is bigger than normal, if you are male.  A blockage in the part of your body that drains pee from the bladder.  A kidney stone.  A nerve condition that affects your bladder.  Not getting enough to drink.  Not peeing often enough.  You have other conditions, such as:  Diabetes.  A weak disease-fighting system (immune system).  Sickle cell disease.  Gout.  Injury of the spine.  What are the signs or symptoms?  Symptoms of this condition include:  Needing to pee right away.  Peeing small amounts often.  Pain or burning when peeing.  Blood in the pee.  Pee that smells bad or not like normal.  Trouble peeing.  Pee that is cloudy.  Fluid coming from the vagina, if you are female.  Pain in the belly or lower back.  Other symptoms include:  Vomiting.  Not feeling hungry.  Feeling mixed up (confused). This may be the first symptom in  older adults.  Being tired and grouchy (irritable).  A fever.  Watery poop (diarrhea).  How is this treated?  Taking antibiotic medicine.  Taking other medicines.  Drinking enough water.  In some cases, you may need to see a specialist.  Follow these instructions at home:    Medicines  Take over-the-counter and prescription medicines only as told by your doctor.  If you were prescribed an antibiotic medicine, take it as told by your doctor. Do not stop taking it even if you start to feel better.  General instructions  Make sure you:  Pee until your bladder is empty.  Do not hold pee for a long time.  Empty your bladder after sex.  Wipe from front to back after peeing or pooping if you are a female. Use each tissue one time when you wipe.  Drink enough fluid to keep your pee pale yellow.  Keep all follow-up visits.  Contact a doctor if:  You do not get better after 1-2 days.  Your symptoms go away and then come back.  Get help right away if:  You have very bad back pain.  You have very bad pain in your lower belly.  You have a fever.  You have chills.  You feeling like you will vomit or you vomit.  Summary  A urinary tract infection (UTI) is an infection of any part of the urinary tract.  This condition is caused by germs in your genital area.  There are many risk factors for a UTI.  Treatment includes antibiotic medicines.  Drink enough fluid to keep your pee pale yellow.  This information is not intended to replace advice given to you by your health care provider. Make sure you discuss any questions you have with your health care provider.  Document Revised: 07/30/2021 Document Reviewed: 07/30/2021  Elsevier Patient Education c 2023 [employer]

## 2023-08-22 LAB
BACTERIA UR CULT: NORMAL
BACTERIA UR CULT: NORMAL

## 2023-10-18 DIAGNOSIS — J06.9 ACUTE URI: Primary | ICD-10-CM

## 2023-10-18 RX ORDER — AMOXICILLIN 875 MG/1
875 TABLET, COATED ORAL 2 TIMES DAILY
Qty: 20 TABLET | Refills: 0 | Status: SHIPPED | OUTPATIENT
Start: 2023-10-18 | End: 2023-10-28

## 2024-01-10 DIAGNOSIS — I10 ESSENTIAL HYPERTENSION: ICD-10-CM

## 2024-01-10 RX ORDER — LISINOPRIL 10 MG/1
10 TABLET ORAL DAILY
Qty: 90 TABLET | Refills: 0 | Status: SHIPPED | OUTPATIENT
Start: 2024-01-10

## 2024-04-10 DIAGNOSIS — I10 ESSENTIAL HYPERTENSION: ICD-10-CM

## 2024-04-10 RX ORDER — LISINOPRIL 10 MG/1
10 TABLET ORAL DAILY
Qty: 30 TABLET | Refills: 0 | Status: SHIPPED | OUTPATIENT
Start: 2024-04-10